# Patient Record
Sex: FEMALE | Race: WHITE | NOT HISPANIC OR LATINO | Employment: OTHER | URBAN - METROPOLITAN AREA
[De-identification: names, ages, dates, MRNs, and addresses within clinical notes are randomized per-mention and may not be internally consistent; named-entity substitution may affect disease eponyms.]

---

## 2020-08-21 ENCOUNTER — HOSPITAL ENCOUNTER (OUTPATIENT)
Dept: GENERAL RADIOLOGY | Facility: HOSPITAL | Age: 50
Discharge: HOME | End: 2020-08-21
Admitting: INTERNAL MEDICINE

## 2020-08-21 ENCOUNTER — TRANSCRIBE ORDERS (OUTPATIENT)
Dept: ADMINISTRATIVE | Facility: HOSPITAL | Age: 50
End: 2020-08-21

## 2020-08-21 DIAGNOSIS — R59.0 CERVICAL ADENOPATHY: ICD-10-CM

## 2020-08-21 DIAGNOSIS — R59.0 CERVICAL ADENOPATHY: Primary | ICD-10-CM

## 2020-08-21 PROCEDURE — 72050 X-RAY EXAM NECK SPINE 4/5VWS: CPT

## 2020-08-27 ENCOUNTER — TRANSCRIBE ORDERS (OUTPATIENT)
Dept: ADMINISTRATIVE | Facility: HOSPITAL | Age: 50
End: 2020-08-27

## 2020-08-27 DIAGNOSIS — M54.12 RADICULOPATHY, CERVICAL: Primary | ICD-10-CM

## 2020-08-28 ENCOUNTER — HOSPITAL ENCOUNTER (OUTPATIENT)
Dept: MRI IMAGING | Facility: HOSPITAL | Age: 50
Discharge: HOME | End: 2020-08-28
Admitting: INTERNAL MEDICINE

## 2020-08-28 DIAGNOSIS — M54.12 RADICULOPATHY, CERVICAL: ICD-10-CM

## 2020-08-28 PROCEDURE — 72141 MRI NECK SPINE W/O DYE: CPT

## 2021-05-05 ENCOUNTER — OFFICE VISIT (OUTPATIENT)
Dept: INTERNAL MEDICINE | Facility: CLINIC | Age: 51
End: 2021-05-05

## 2021-05-05 VITALS
SYSTOLIC BLOOD PRESSURE: 120 MMHG | BODY MASS INDEX: 29.37 KG/M2 | HEIGHT: 64 IN | HEART RATE: 81 BPM | OXYGEN SATURATION: 98 % | WEIGHT: 172 LBS | TEMPERATURE: 97.3 F | RESPIRATION RATE: 16 BRPM | DIASTOLIC BLOOD PRESSURE: 78 MMHG

## 2021-05-05 DIAGNOSIS — R63.5 WEIGHT GAIN: ICD-10-CM

## 2021-05-05 DIAGNOSIS — M43.22 FUSION OF SPINE, CERVICAL REGION: Primary | ICD-10-CM

## 2021-05-05 PROCEDURE — 99214 OFFICE O/P EST MOD 30 MIN: CPT | Performed by: INTERNAL MEDICINE

## 2021-05-05 RX ORDER — PHENTERMINE HYDROCHLORIDE 37.5 MG/1
37.5 CAPSULE ORAL EVERY MORNING
Qty: 30 CAPSULE | Refills: 1 | Status: SHIPPED | OUTPATIENT
Start: 2021-05-05 | End: 2021-07-06 | Stop reason: SDUPTHER

## 2021-05-05 RX ORDER — ESTRADIOL 0.07 MG/D
FILM, EXTENDED RELEASE TRANSDERMAL
COMMUNITY
Start: 2021-04-12

## 2021-05-05 RX ORDER — RIBOFLAVIN (VITAMIN B2) 100 MG
TABLET ORAL
COMMUNITY
End: 2022-04-04

## 2021-05-05 RX ORDER — LISINOPRIL AND HYDROCHLOROTHIAZIDE 20; 12.5 MG/1; MG/1
TABLET ORAL
COMMUNITY
Start: 2021-04-30 | End: 2021-07-30

## 2021-05-05 RX ORDER — MELATONIN
1000 DAILY
COMMUNITY
End: 2022-04-04

## 2021-05-05 RX ORDER — VENLAFAXINE HYDROCHLORIDE 150 MG/1
CAPSULE, EXTENDED RELEASE ORAL
COMMUNITY
Start: 2021-02-16 | End: 2021-05-14

## 2021-05-05 RX ORDER — BACLOFEN 10 MG/1
TABLET ORAL
COMMUNITY
Start: 2021-04-13 | End: 2021-07-22 | Stop reason: SDUPTHER

## 2021-05-05 NOTE — PROGRESS NOTES
"Chief Complaint  Weight Loss (discuss phentermine )    Subjective          Elif Lujan presents to [facility]  History of Present Illness  Denies any significant headache, shortness of breath or chest pain.  No visual changes.  Taking medication without complication.  She is interested in the phentermine again for weight loss.  She is having a difficult time.  She is also postop cervical fusion from January and not been very active.  She has just recently restarted some exercise  Objective   Vital Signs:   /78 (BP Location: Left arm, Patient Position: Sitting, Cuff Size: Large Adult)   Pulse 81   Temp 97.3 °F (36.3 °C) (Temporal)   Resp 16   Ht 162.6 cm (64\")   Wt 78 kg (172 lb)   SpO2 98%   BMI 29.52 kg/m²     Physical Exam  Vitals and nursing note reviewed.   Constitutional:       Appearance: Normal appearance.   HENT:      Head: Normocephalic and atraumatic.   Cardiovascular:      Rate and Rhythm: Normal rate and regular rhythm.   Pulmonary:      Effort: Pulmonary effort is normal.      Breath sounds: Normal breath sounds.   Abdominal:      General: Abdomen is flat.      Palpations: Abdomen is soft.   Musculoskeletal:         General: No swelling or deformity.      Cervical back: Neck supple.      Right lower leg: No edema.      Left lower leg: No edema.   Skin:     General: Skin is warm.      Capillary Refill: Capillary refill takes less than 2 seconds.      Findings: No rash.   Neurological:      General: No focal deficit present.      Mental Status: She is alert and oriented to person, place, and time.        Result Review :                 Assessment and Plan weight gain.  We talked about the difficulty is perimenopausal and postmenopausal with weight loss.  My fitness pal or similar maribell on the phone to monitor calories closely.  Exercise which she is just started back.  Phentermine 37.5 mg daily and recheck in 2 months or sooner if problems.  She " is taking it before without side effect  Post cervical fusion doing well.  Still going to physical therapy.  Has not started back to work but starts later this month.  Schedule wellness on follow-up  Diagnoses and all orders for this visit:    1. Fusion of spine, cervical region (Primary)    2. Weight gain  -     phentermine 37.5 MG capsule; Take 1 capsule by mouth Every Morning.  Dispense: 30 capsule; Refill: 1        Follow Up   Return in about 2 months (around 7/5/2021).  Patient was given instructions and counseling regarding her condition or for health maintenance advice. Please see specific information pulled into the AVS if appropriate.

## 2021-05-14 RX ORDER — VENLAFAXINE HYDROCHLORIDE 150 MG/1
CAPSULE, EXTENDED RELEASE ORAL
Qty: 90 CAPSULE | Refills: 0 | Status: SHIPPED | OUTPATIENT
Start: 2021-05-14 | End: 2021-08-12

## 2021-07-03 DIAGNOSIS — R63.5 WEIGHT GAIN: ICD-10-CM

## 2021-07-03 RX ORDER — PHENTERMINE HYDROCHLORIDE 37.5 MG/1
CAPSULE ORAL
Qty: 30 CAPSULE | Refills: 0 | OUTPATIENT
Start: 2021-07-03

## 2021-07-06 ENCOUNTER — OFFICE VISIT (OUTPATIENT)
Dept: INTERNAL MEDICINE | Facility: CLINIC | Age: 51
End: 2021-07-06

## 2021-07-06 VITALS
SYSTOLIC BLOOD PRESSURE: 136 MMHG | HEIGHT: 64 IN | DIASTOLIC BLOOD PRESSURE: 80 MMHG | TEMPERATURE: 97.3 F | RESPIRATION RATE: 16 BRPM | HEART RATE: 62 BPM | BODY MASS INDEX: 28.34 KG/M2 | OXYGEN SATURATION: 96 % | WEIGHT: 166 LBS

## 2021-07-06 DIAGNOSIS — Z12.11 COLON CANCER SCREENING: Primary | ICD-10-CM

## 2021-07-06 DIAGNOSIS — R63.5 WEIGHT GAIN: ICD-10-CM

## 2021-07-06 DIAGNOSIS — I10 ESSENTIAL (PRIMARY) HYPERTENSION: ICD-10-CM

## 2021-07-06 DIAGNOSIS — E66.3 EXCESS WEIGHT: ICD-10-CM

## 2021-07-06 PROCEDURE — 99214 OFFICE O/P EST MOD 30 MIN: CPT | Performed by: INTERNAL MEDICINE

## 2021-07-06 RX ORDER — PHENTERMINE HYDROCHLORIDE 37.5 MG/1
37.5 CAPSULE ORAL EVERY MORNING
Qty: 30 CAPSULE | Refills: 1 | Status: SHIPPED | OUTPATIENT
Start: 2021-07-06 | End: 2021-09-13 | Stop reason: SDUPTHER

## 2021-07-06 NOTE — PROGRESS NOTES
"Chief Complaint  Follow-up (phentermine rf)    Subjective          Elif Lujan presents to [facility]  History of Present Illness  Denies any significant headache, shortness of breath or chest pain.  No visual changes.  Taking medication without complication.  The phentermine does help suppress her appetite.  No specific dietary plan although she has tried to reduce caloric intake.  Not really and exercise are typically  Objective   Vital Signs:   /80 (BP Location: Left arm, Patient Position: Sitting, Cuff Size: Large Adult)   Pulse 62   Temp 97.3 °F (36.3 °C) (Temporal)   Resp 16   Ht 162.6 cm (64\")   Wt 75.3 kg (166 lb)   SpO2 96%   BMI 28.49 kg/m²     Physical Exam  Vitals and nursing note reviewed.   Constitutional:       Appearance: Normal appearance.   HENT:      Head: Normocephalic and atraumatic.   Cardiovascular:      Rate and Rhythm: Normal rate and regular rhythm.   Pulmonary:      Effort: Pulmonary effort is normal.      Breath sounds: Normal breath sounds.   Abdominal:      General: Abdomen is flat.      Palpations: Abdomen is soft.   Musculoskeletal:         General: No swelling or deformity.      Cervical back: Neck supple.      Right lower leg: No edema.      Left lower leg: No edema.   Skin:     General: Skin is warm.      Capillary Refill: Capillary refill takes less than 2 seconds.      Findings: No rash.   Neurological:      General: No focal deficit present.      Mental Status: She is alert and oriented to person, place, and time.        Result Review :                 Assessment and Plan essential hypertension and hyperlipidemia.  She is on the phentermine with pretty good results.  A 3 pound weight loss each month since last visit.  She is still wants to continue the medication.  Again I strongly encouraged caloric restriction and exercise.  Cervical neck pain status post cervical fusion with still some weakness in her right arm but " improving slowly.  Continuing physical therapy  Diagnoses and all orders for this visit:    1. Colon cancer screening (Primary)  -     Ambulatory Referral to Gastroenterology    2. Weight gain  -     phentermine 37.5 MG capsule; Take 1 capsule by mouth Every Morning.  Dispense: 30 capsule; Refill: 1    3. Essential (primary) hypertension    4. Excess weight        Follow Up   Return in about 2 months (around 9/6/2021) for Annual physical.  Patient was given instructions and counseling regarding her condition or for health maintenance advice. Please see specific information pulled into the AVS if appropriate.

## 2021-07-13 ENCOUNTER — TRANSCRIBE ORDERS (OUTPATIENT)
Dept: ADMINISTRATIVE | Facility: HOSPITAL | Age: 51
End: 2021-07-13

## 2021-07-13 DIAGNOSIS — M47.22 CERVICAL SPONDYLOSIS WITH RADICULOPATHY: Primary | ICD-10-CM

## 2021-07-22 RX ORDER — BACLOFEN 10 MG/1
20 TABLET ORAL 3 TIMES DAILY PRN
Qty: 30 TABLET | Refills: 2 | Status: SHIPPED | OUTPATIENT
Start: 2021-07-22 | End: 2021-08-30

## 2021-07-22 NOTE — TELEPHONE ENCOUNTER
Caller: Elif Lujan    Relationship: Self    Best call back number: [phone]    Medication needed:   Requested Prescriptions     Pending Prescriptions Disp Refills   • baclofen (LIORESAL) 10 MG tablet         When do you need the refill by: ASAP    What additional details did the patient provide when requesting the medication: P[ATIENT IS OUT OF MEDICATION. NEEDS REFILLED FOR NERVE DAMAGE IN RIGHT ARM.    Does the patient have less than a 3 day supply:  [x] Yes  [] No    What is the patient's preferred pharmacy: [facility] [address] [facility] [address] - [phone] PH - [phone]

## 2021-07-30 RX ORDER — LISINOPRIL AND HYDROCHLOROTHIAZIDE 20; 12.5 MG/1; MG/1
TABLET ORAL
Qty: 135 TABLET | Refills: 1 | Status: SHIPPED | OUTPATIENT
Start: 2021-07-30 | End: 2022-02-14 | Stop reason: SDUPTHER

## 2021-08-02 ENCOUNTER — HOSPITAL ENCOUNTER (OUTPATIENT)
Dept: MRI IMAGING | Facility: HOSPITAL | Age: 51
End: 2021-08-02

## 2021-08-12 RX ORDER — VENLAFAXINE HYDROCHLORIDE 150 MG/1
CAPSULE, EXTENDED RELEASE ORAL
Qty: 90 CAPSULE | Refills: 0 | Status: SHIPPED | OUTPATIENT
Start: 2021-08-12 | End: 2021-08-15

## 2021-08-13 ENCOUNTER — TELEPHONE (OUTPATIENT)
Dept: INTERNAL MEDICINE | Facility: CLINIC | Age: 51
End: 2021-08-13

## 2021-08-13 NOTE — TELEPHONE ENCOUNTER
Caller: Julia Lujan    Relationship: Self    Best call back number: [phone]    What medication are you requesting: VENLAFAXINE XR    If a prescription is needed, what is your preferred pharmacy and phone number: [facility] [place] [facility] [address] [facility] [address] - [phone] PH - [phone] FX     Additional notes: PATIENT IS COMPLETELY OUT OF MEDICATION AND PHARMACY IS STATING THEY NEVER RECEIVED THE PRESCRIPTION.

## 2021-08-15 RX ORDER — VENLAFAXINE HYDROCHLORIDE 150 MG/1
CAPSULE, EXTENDED RELEASE ORAL
Qty: 90 CAPSULE | Refills: 0 | Status: SHIPPED | OUTPATIENT
Start: 2021-08-15 | End: 2022-02-01

## 2021-08-30 RX ORDER — BACLOFEN 10 MG/1
TABLET ORAL
Qty: 30 TABLET | Refills: 2 | Status: SHIPPED | OUTPATIENT
Start: 2021-08-30 | End: 2021-11-06

## 2021-08-31 ENCOUNTER — TELEPHONE (OUTPATIENT)
Dept: GASTROENTEROLOGY | Facility: CLINIC | Age: 51
End: 2021-08-31

## 2021-09-13 ENCOUNTER — OFFICE VISIT (OUTPATIENT)
Dept: INTERNAL MEDICINE | Facility: CLINIC | Age: 51
End: 2021-09-13

## 2021-09-13 VITALS
OXYGEN SATURATION: 98 % | SYSTOLIC BLOOD PRESSURE: 124 MMHG | DIASTOLIC BLOOD PRESSURE: 72 MMHG | HEIGHT: 64 IN | WEIGHT: 167 LBS | HEART RATE: 93 BPM | BODY MASS INDEX: 28.51 KG/M2 | TEMPERATURE: 96.9 F | RESPIRATION RATE: 16 BRPM

## 2021-09-13 DIAGNOSIS — R63.5 WEIGHT GAIN: ICD-10-CM

## 2021-09-13 DIAGNOSIS — I10 ESSENTIAL (PRIMARY) HYPERTENSION: Primary | ICD-10-CM

## 2021-09-13 PROCEDURE — 99214 OFFICE O/P EST MOD 30 MIN: CPT | Performed by: INTERNAL MEDICINE

## 2021-09-13 RX ORDER — PHENTERMINE HYDROCHLORIDE 37.5 MG/1
37.5 CAPSULE ORAL EVERY MORNING
Qty: 30 CAPSULE | Refills: 1 | OUTPATIENT
Start: 2021-09-13 | End: 2021-12-15

## 2021-09-13 NOTE — PROGRESS NOTES
"Chief Complaint  Follow-up (weight loss), Hypertension, and Anxiety    Subjective          Julia Lujan presents to [facility]  History of Present Illness  Follow-up for anxiety. Is doing well on the medication. Is tolerating the medication without side effect.  Mood is doing better.  Sleep is good.  She still wants to take the phentermine although has not really lost any weight.  She is going through a lot with her 's colon cancer and I think some of that is contributing to the stress overall.  Objective   Vital Signs:   /72 (BP Location: Left arm, Patient Position: Sitting, Cuff Size: Large Adult)   Pulse 93   Temp 96.9 °F (36.1 °C) (Temporal)   Resp 16   Ht 162.6 cm (64\")   Wt 75.8 kg (167 lb)   SpO2 98%   BMI 28.67 kg/m²     Physical Exam  Vitals and nursing note reviewed.   Constitutional:       Appearance: Normal appearance.   HENT:      Head: Normocephalic and atraumatic.   Cardiovascular:      Rate and Rhythm: Normal rate and regular rhythm.   Pulmonary:      Effort: Pulmonary effort is normal.      Breath sounds: Normal breath sounds.   Abdominal:      General: Abdomen is flat.      Palpations: Abdomen is soft.   Musculoskeletal:         General: No swelling or deformity.      Cervical back: Neck supple.      Right lower leg: No edema.      Left lower leg: No edema.   Skin:     General: Skin is warm.      Capillary Refill: Capillary refill takes less than 2 seconds.      Findings: No rash.   Neurological:      General: No focal deficit present.      Mental Status: She is alert and oriented to person, place, and time.        Result Review : Reviewed previous labs                Assessment and Plan anxiety disorder and depression.  Continue the venlafaxine.  Seems to be doing pretty well.  Weight gain and difficulty losing weight again we have had this conversation multiple times.  I think the phentermine is of limited use or value.  She " wants to continue it at least another couple of months.  Her  is undergoing salvage radiation for colon cancer.  A lot of stress related to that of course.  We will continue it for now.  Encouraged weight watchers, intermittent fasting other strategies for caloric restriction and exercise.  Blood pressure stable.  Continue current medication.  Get colonoscopy!  She says she filled out the paperwork to complete  There are no diagnoses linked to this encounter.    Follow Up   No follow-ups on file.  Patient was given instructions and counseling regarding her condition or for health maintenance advice. Please see specific information pulled into the AVS if appropriate.

## 2021-10-21 NOTE — TELEPHONE ENCOUNTER
RETURN CALL FROM PATIENT.  SCHEDULED AT [place] ON 03/11/2022 AT 1:15PM, ARRIVE 12PM.  WILL MAIL INSTRUCTIONS.

## 2021-10-24 ENCOUNTER — PREP FOR SURGERY (OUTPATIENT)
Dept: OTHER | Facility: HOSPITAL | Age: 51
End: 2021-10-24

## 2021-10-24 DIAGNOSIS — Z12.11 SCREEN FOR COLON CANCER: Primary | ICD-10-CM

## 2021-10-25 PROBLEM — Z12.11 SCREEN FOR COLON CANCER: Status: ACTIVE | Noted: 2021-10-25

## 2021-11-06 RX ORDER — BACLOFEN 10 MG/1
TABLET ORAL
Qty: 30 TABLET | Refills: 2 | Status: SHIPPED | OUTPATIENT
Start: 2021-11-06 | End: 2022-03-11 | Stop reason: HOSPADM

## 2021-11-25 DIAGNOSIS — R63.5 WEIGHT GAIN: ICD-10-CM

## 2021-11-27 RX ORDER — PHENTERMINE HYDROCHLORIDE 37.5 MG/1
CAPSULE ORAL
Qty: 30 CAPSULE | OUTPATIENT
Start: 2021-11-27

## 2021-12-15 ENCOUNTER — HOSPITAL ENCOUNTER (EMERGENCY)
Facility: HOSPITAL | Age: 51
Discharge: HOME | End: 2021-12-15
Attending: EMERGENCY MEDICINE | Admitting: EMERGENCY MEDICINE

## 2021-12-15 ENCOUNTER — APPOINTMENT (OUTPATIENT)
Dept: GENERAL RADIOLOGY | Facility: HOSPITAL | Age: 51
End: 2021-12-15

## 2021-12-15 VITALS
HEIGHT: 64 IN | BODY MASS INDEX: 29.71 KG/M2 | TEMPERATURE: 97.7 F | WEIGHT: 174 LBS | DIASTOLIC BLOOD PRESSURE: 81 MMHG | HEART RATE: 75 BPM | SYSTOLIC BLOOD PRESSURE: 116 MMHG | RESPIRATION RATE: 16 BRPM | OXYGEN SATURATION: 100 %

## 2021-12-15 DIAGNOSIS — S83.92XA SPRAIN OF LEFT KNEE, UNSPECIFIED LIGAMENT, INITIAL ENCOUNTER: Primary | ICD-10-CM

## 2021-12-15 PROCEDURE — 99283 EMERGENCY DEPT VISIT LOW MDM: CPT

## 2021-12-15 PROCEDURE — 73562 X-RAY EXAM OF KNEE 3: CPT

## 2021-12-15 PROCEDURE — 99283 EMERGENCY DEPT VISIT LOW MDM: CPT | Performed by: EMERGENCY MEDICINE

## 2021-12-15 NOTE — DISCHARGE INSTRUCTIONS
Rest, ice, elevate the knee as needed.  Limit weightbearing activities until you are seen by the orthopedics doctor.  Please return to the emergency room for any worsening pain, swelling, fevers, weakness or any other concerns.

## 2021-12-15 NOTE — ED NOTES
Pt complaining of LLE pain since last night. Pt stated that she was getting into her SUV and heard a pop. Pt able to ambulate after the incident. Pt is able to bend and extend her knee without difficulty. No signs of trauma noted.     Elmer Mendenhall, RN  12/15/21 8857       Elmer Mendenhall RN  12/15/21 1113

## 2021-12-15 NOTE — ED PROVIDER NOTES
Subjective   History of Present Illness  History of Present Illness    Chief complaint: Knee pain    Location: Left knee    Quality/Severity: Moderate pain    Timing/Duration: Injured last evening    Modifying Factors: Hurts to stand or walk    Narrative: This patient presents for evaluation of pain in the left knee area.  She has had some knee issues for quite some time that she attributes to repetitive twisting and turning while on her feet at work as a .  However, yesterday evening, she was stepping up into her [employer] SUV and when doing so she had a sudden pop with intense pain towards the back of the knee on the left side.  Since then the knee has persistently hurt in the same area.  It is difficult for her to stand or walk because of the pain today.  She denies any other areas of injury or concern.    Associated Symptoms: As above    Review of Systems   Constitutional: Negative for activity change and fever.   HENT: Negative.    Eyes: Negative for visual disturbance.   Respiratory: Negative for cough and shortness of breath.    Cardiovascular: Negative for chest pain.   Gastrointestinal: Negative for abdominal pain.   Musculoskeletal: Positive for arthralgias and gait problem. Negative for myalgias.   Skin: Negative for color change and rash.   Neurological: Negative for syncope and weakness.   All other systems reviewed and are negative.      Past Medical History:   Diagnosis Date   • Cervicalgia    • Chest pain, unspecified    • Elevated blood pressure reading without diagnosis of hypertension    • Essential (primary) hypertension    • H/O mammogram 01/2019   • Headache    • Headache    • Hematuria, unspecified    • Lesion of sciatic nerve, left lower limb    • Major depressive disorder, single episode, unspecified    • Menopausal and female climacteric states    • Nevus, non-neoplastic    • Nontraumatic hematoma of soft tissue    • Other fatigue    • Pain in limb    • Pain in thoracic  "spine    • Papanicolaou smear 2017   • Pneumonia, unspecified organism    • Radiculopathy, cervical region    • Rib pain    • Unspecified injury of muscle(s) and tendon(s) of the rotator cuff of right shoulder, initial encounter        Allergies   Allergen Reactions   • Hydrocodone-Acetaminophen Rash     \"Hyper\"   • Prednisone Rash       Past Surgical History:   Procedure Laterality Date   • HYSTERECTOMY     • NECK SURGERY         Family History   Problem Relation Age of Onset   • Lymphoma Mother    • Other Father         BLADDER CANCER   • Breast cancer Maternal Grandmother    • Other Maternal Uncle         BRACA GENE+       Social History     Socioeconomic History   • Marital status:    • Number of children: 2   Tobacco Use   • Smoking status: Former Smoker   • Smokeless tobacco: Never Used   • Tobacco comment: QUIT 2019 STATED AGE 13   Vaping Use   • Vaping Use: Never used   Substance and Sexual Activity   • Alcohol use: Yes     Comment: WINE 5X A WEEK   • Drug use: Yes     Types: Marijuana     Comment: ROUTINELY   • Sexual activity: Defer     ED Triage Vitals [12/15/21 1054]   Temp Heart Rate Resp BP SpO2   97.7 °F (36.5 °C) 76 16 142/78 99 %      Temp src Heart Rate Source Patient Position BP Location FiO2 (%)   Oral Monitor -- -- --     Objective   Physical Exam  Vitals and nursing note reviewed.   Constitutional:       Appearance: She is well-developed.   HENT:      Head: Normocephalic and atraumatic.   Eyes:      General:         Right eye: No discharge.         Left eye: No discharge.      Pupils: Pupils are equal, round, and reactive to light.   Cardiovascular:      Rate and Rhythm: Normal rate and regular rhythm.      Pulses: Normal pulses.   Pulmonary:      Effort: Pulmonary effort is normal. No respiratory distress.   Musculoskeletal:         General: Tenderness present. No deformity. Normal range of motion.      Cervical back: Normal range of motion and neck supple.      Comments: Moderate " "tenderness throughout the posterior and medial and lateral aspects of the left knee.  Anterior aspect is less tender.  Minimal soft tissue swelling evident.  Patient is able to flex and extend the knee through completely normal range of motion.  No popping or grinding or clicking palpable through range of motion testing.  She has normal strength to all muscle groups of the left leg.  Distal neurovascular exam is normal   Skin:     General: Skin is warm and dry.      Findings: No bruising, erythema or rash.   Neurological:      General: No focal deficit present.      Mental Status: She is alert and oriented to person, place, and time. Mental status is at baseline.   Psychiatric:         Behavior: Behavior normal.         Thought Content: Thought content normal.         Judgment: Judgment normal.       RADIOLOGY        Study: X-ray left knee    Findings: Negative.    Interpreted contemporaneously with treatment by Dr. Ibarra, independently viewed by me    Procedures           ED Course  ED Course as of 12/15/21 1209   Wed Dec 15, 2021   1208 I reviewed the x-ray which was reassuring for no acute osseous injury.  I spoke to the patient about the need for follow-up in orthopedics clinic for further evaluation of her pain symptoms which will likely need further imaging such as MRI.  We placed a knee immobilizer on her for supportive care.  Advised nonsteroidal anti-inflammatory medications as needed for now.  Discharged home in good condition with usual \"return to ER\" instructions [STEPHANIE]      ED Course User Index  [STEPHANIE] Denny Mares MD                                                 MDM  Number of Diagnoses or Management Options     Amount and/or Complexity of Data Reviewed  Tests in the radiology section of CPT®: reviewed and ordered  Independent visualization of images, tracings, or specimens: yes    Risk of Complications, Morbidity, and/or Mortality  Presenting problems: moderate  Diagnostic procedures: " low  Management options: moderate        Final diagnoses:   Sprain of left knee, unspecified ligament, initial encounter       ED Disposition  ED Disposition     ED Disposition Condition Comment    Discharge Stable           Kale Wilkerson MD  [address] [id number] [phone]    Call today  Call the orthopedics office today to schedule a prompt follow-up appointment for further evaluation         Medication List      Stop    phentermine 37.5 MG capsule             Denny Mares MD  12/15/21 0642

## 2022-01-13 ENCOUNTER — OFFICE VISIT (OUTPATIENT)
Dept: ORTHOPEDIC SURGERY | Facility: CLINIC | Age: 52
End: 2022-01-13

## 2022-01-13 ENCOUNTER — TELEPHONE (OUTPATIENT)
Dept: ORTHOPEDIC SURGERY | Facility: CLINIC | Age: 52
End: 2022-01-13

## 2022-01-13 VITALS
BODY MASS INDEX: 29.71 KG/M2 | SYSTOLIC BLOOD PRESSURE: 118 MMHG | HEIGHT: 64 IN | DIASTOLIC BLOOD PRESSURE: 62 MMHG | WEIGHT: 174 LBS

## 2022-01-13 DIAGNOSIS — M25.462 EFFUSION OF LEFT KNEE: ICD-10-CM

## 2022-01-13 DIAGNOSIS — M25.562 MECHANICAL KNEE PAIN, LEFT: Primary | ICD-10-CM

## 2022-01-13 PROCEDURE — 99203 OFFICE O/P NEW LOW 30 MIN: CPT | Performed by: ORTHOPAEDIC SURGERY

## 2022-01-13 NOTE — TELEPHONE ENCOUNTER
Left pt. Message regarding mri.  Stated that she was just seen today and Dr. Wilkerson is still seeing patients.  MRI will have to get authorized prior to scheduling and she should receive a call in a couple days.  If not, she can contact the MRI scheduling dept.  jae

## 2022-01-13 NOTE — TELEPHONE ENCOUNTER
Provider:  DR. MYLES    Caller: YANETH HINKLE    Relationship to Patient: SELF     Phone Number: [phone]    Reason for Call:  PATIENT ADVISED SHE DISCUSSED GETTING MRI WITH DR. MYLES TODAY. PATIENT STATED SHE CONTACTED [facility] TO SCHEDULE MRI AND WAS TOLD DR. MYLES HAS NOT PUT IN THE MRI ORDER.       PATIENT ADVISED SHE NEEDS DR. MYLES TO SEND MRI ORDER  FOR HER LEFT KNEE TO [facility].

## 2022-01-13 NOTE — PROGRESS NOTES
The patient has consented to being recorded using LAURA.     Subjective:     Patient ID: Julia Lujan is a 51 y.o. female.    Chief Complaint:  Left knee pain, new patient    History of Present Illness  Julia Lujan presents to clinic today for evaluation of of left knee pain. The patient states that she had neck surgery in 01/2021. She compensated for decreased cervical range of motion with lots of pivoting through the knees. She also reports exercising on both the treadmill and elliptical. Following all of this, 3 weeks ago she was getting into the passenger's side of her truck when she felt a heard a pop in the left knee. The patient presented to the ER with minimal swelling. She reports pain is aching in nature and 2 out of 10 in intensity with standing; however, it will reach a 6 to 7 out of 10 with squatting and pivoting. She denies locking, catching, shooting pain down the left lower extremity, and giving way. Alleviating factors include rest and activity modification.      Social History     Occupational History   • Occupation:    Tobacco Use   • Smoking status: Former Smoker   • Smokeless tobacco: Never Used   • Tobacco comment: QUIT 2019 STATED AGE 13   Vaping Use   • Vaping Use: Never used   Substance and Sexual Activity   • Alcohol use: Yes     Comment: WINE 5X A WEEK   • Drug use: Yes     Types: Marijuana     Comment: ROUTINELY   • Sexual activity: Defer      Past Medical History:   Diagnosis Date   • Cervicalgia    • Chest pain, unspecified    • Elevated blood pressure reading without diagnosis of hypertension    • Essential (primary) hypertension    • H/O mammogram 01/2019   • Headache    • Headache    • Hematuria, unspecified    • Lesion of sciatic nerve, left lower limb    • Major depressive disorder, single episode, unspecified    • Menopausal and female climacteric states    • Nevus, non-neoplastic    • Nontraumatic hematoma of soft tissue    • Other fatigue    • Pain in limb   "  • Pain in thoracic spine    • Papanicolaou smear 2017   • Pneumonia, unspecified organism    • Radiculopathy, cervical region    • Rib pain    • Unspecified injury of muscle(s) and tendon(s) of the rotator cuff of right shoulder, initial encounter      Past Surgical History:   Procedure Laterality Date   • HYSTERECTOMY     • NECK SURGERY         Family History   Problem Relation Age of Onset   • Lymphoma Mother    • Other Father         BLADDER CANCER   • Breast cancer Maternal Grandmother    • Other Maternal Uncle         BRACA GENE+         Review of Systems        Objective:  Vitals:    01/13/22 1234   BP: 118/62   BP Location: Right arm   Weight: 78.9 kg (174 lb)   Height: 162.6 cm (64\")         01/13/22  1234   Weight: 78.9 kg (174 lb)     Body mass index is 29.87 kg/m².  Physical Exam    Vital signs reviewed.   General: No acute distress, alert and oriented  Eyes: conjunctiva clear; pupils equally round and reactive  ENT: external ears and nose atraumatic; oropharynx clear  CV: no peripheral edema  Resp: normal respiratory effort  Skin: no rashes or wounds; normal turgor  Psych: mood and affect appropriate; recent and remote memory intact          Ortho Exam     Left Knee-     ROM 0 to 130 degrees  Strength 4+/5 on flexion  Strength 4+/5 on extension  Maximal tenderness along the lateral joint line.     Effusion- Moderate.   Grade 1A Lachman  Anterior drawer- Negative.   Posterior drawer- Negative.   Stable to varus and valgus stress at 0 and 30 degrees.      Amy's- Positive with pain along the lateral joint line with mild click.   Dial's- Negative.       Positive sensation light touch all distributions symmetric to contralateral side  Brisk cap refill all digits  2+ dorsalis pedis pulse    Imaging:  Review of outside x-ray left knee from December 15, 2021, these appear to be nonweightbearing x-rays, indicate no evidence of fracture, dislocation, or subluxation.  No comparison images " available.    Assessment:        1. Mechanical knee pain, left    2. Effusion of left knee           Plan:          1. Discussed treatment options at length with patient at today's visit to include observation with conservative management with anti-inflammatories and/or oral steroid versus MRI to evaluate for meniscus tear.   2. Patient does wish to proceed with MRI at this point in time to evaluate for meniscal pathology.   3. I will contact the patient over the phone to review results and discuss further treatment options at that time.       Julia Lujan was in agreement with plan and had all questions answered.     Orders:  Orders Placed This Encounter   Procedures   • MRI Knee Left Without Contrast       Medications:  No orders of the defined types were placed in this encounter.      Followup:  Return for review of MRI results.    Diagnoses and all orders for this visit:    1. Mechanical knee pain, left (Primary)  -     MRI Knee Left Without Contrast; Future    2. Effusion of left knee  -     MRI Knee Left Without Contrast; Future          Dictated utilizing Dragon dictation     Transcribed from ambient dictation for Kale Wilkerson MD by Yusra Mohr.  01/13/22   16:34 EST    Patient verbalized consent to the visit recording.

## 2022-01-13 NOTE — TELEPHONE ENCOUNTER
When you get a moment can you please place the MRI order for the attached patient's Left knee to be done at [facility].    Thanks.

## 2022-02-01 RX ORDER — VENLAFAXINE HYDROCHLORIDE 150 MG/1
CAPSULE, EXTENDED RELEASE ORAL
Qty: 90 CAPSULE | Refills: 0 | Status: SHIPPED | OUTPATIENT
Start: 2022-02-01 | End: 2022-05-07

## 2022-02-03 ENCOUNTER — HOSPITAL ENCOUNTER (OUTPATIENT)
Dept: MRI IMAGING | Facility: HOSPITAL | Age: 52
End: 2022-02-03

## 2022-02-11 ENCOUNTER — HOSPITAL ENCOUNTER (OUTPATIENT)
Dept: MRI IMAGING | Facility: HOSPITAL | Age: 52
Discharge: HOME | End: 2022-02-11
Admitting: ORTHOPAEDIC SURGERY

## 2022-02-11 DIAGNOSIS — M25.562 MECHANICAL KNEE PAIN, LEFT: ICD-10-CM

## 2022-02-11 DIAGNOSIS — M25.462 EFFUSION OF LEFT KNEE: ICD-10-CM

## 2022-02-11 PROCEDURE — 73721 MRI JNT OF LWR EXTRE W/O DYE: CPT

## 2022-02-14 RX ORDER — LISINOPRIL AND HYDROCHLOROTHIAZIDE 20; 12.5 MG/1; MG/1
1.5 TABLET ORAL DAILY
Qty: 135 TABLET | Refills: 0 | Status: ON HOLD | OUTPATIENT
Start: 2022-02-14 | End: 2022-04-12

## 2022-02-15 ENCOUNTER — TELEPHONE (OUTPATIENT)
Dept: ORTHOPEDIC SURGERY | Facility: CLINIC | Age: 52
End: 2022-02-15

## 2022-02-15 NOTE — TELEPHONE ENCOUNTER
"Patient calling for MRI results of her Left knee per the chart note 01.13.2022 \"I will contact the patient over the phone to review results and discuss further treatment options at that time\"    MRI Knee Left Without Contrast    Result Date: 2/14/2022  MRI Knee LT WO INDICATION:  Posterior left knee pain intermittently for 2 years. No specific injury. No prior left knee surgery. TECHNIQUE: MRI of the left knee without IV contrast. COMPARISON: Left knee x-rays 12/15/2021 FINDINGS: Menisci:    Vertical radial tear of the posterior horn/posterior root medial meniscus with partial extrusion of the body segment from the medial joint line. Lateral meniscus is intact. Ligaments:    Cruciate ligaments:  Intact.    Medial collateral ligament complex:  Intact.    Lateral collateral ligament complex:  Intact. Extensor mechanism: Intact. Fluid: Small to moderate joint effusion. 5 cm popliteal cyst with fluid tracking along the posterior fascial planes of the leg, suggesting rupture/leak. Articular cartilage:    Patellofemoral compartment:  Full-thickness chondral fissuring along the median ridge of the patella. Moderate to high-grade chondromalacia of the central and medial femoral trochlea.    Medial compartment:  Moderate grade chondromalacia along the weightbearing surfaces of the medial compartment. High-grade chondromalacia along the posterior nonweightbearing medial femoral condyle.    Lateral compartment: Moderate grade chondromalacia along the posterior nonweightbearing lateral femoral condyle. Osseous structures and musculature:  Mild reactive subarticular marrow edema along the medial peripheral aspect of the medial tibial plateau. Remainder of the bone marrow signal is within expected limits. Visualized musculature is within normal limits.     1. Vertical radial tear of the posterior horn/posterior root medial meniscus resulting in partial extrusion of the body segment from the medial joint line. 2. Cruciate and " collateral ligaments are intact. 3. Tricompartmental chondromalacia, detailed above. 4. Small to moderate joint effusion with a ruptured/leaking 5 cm popliteal cyst. Signer Name: Noe Sanchez MD  Signed: 2/14/2022 9:02 AM  Workstation Name: [id number]  [facility]    Please advise.

## 2022-02-17 ENCOUNTER — OFFICE VISIT (OUTPATIENT)
Dept: ORTHOPEDIC SURGERY | Facility: CLINIC | Age: 52
End: 2022-02-17

## 2022-02-17 VITALS
WEIGHT: 171 LBS | HEIGHT: 64 IN | DIASTOLIC BLOOD PRESSURE: 78 MMHG | HEART RATE: 94 BPM | BODY MASS INDEX: 29.19 KG/M2 | SYSTOLIC BLOOD PRESSURE: 118 MMHG

## 2022-02-17 DIAGNOSIS — R29.898 SHOULDER WEAKNESS: ICD-10-CM

## 2022-02-17 DIAGNOSIS — S83.232A COMPLEX TEAR OF MEDIAL MENISCUS OF LEFT KNEE AS CURRENT INJURY, INITIAL ENCOUNTER: ICD-10-CM

## 2022-02-17 DIAGNOSIS — M25.511 RIGHT SHOULDER PAIN, UNSPECIFIED CHRONICITY: Primary | ICD-10-CM

## 2022-02-17 PROCEDURE — 99214 OFFICE O/P EST MOD 30 MIN: CPT | Performed by: ORTHOPAEDIC SURGERY

## 2022-02-17 PROCEDURE — 73030 X-RAY EXAM OF SHOULDER: CPT | Performed by: ORTHOPAEDIC SURGERY

## 2022-02-17 RX ORDER — MELOXICAM 15 MG/1
15 TABLET ORAL DAILY
Qty: 30 TABLET | Refills: 0 | Status: SHIPPED | OUTPATIENT
Start: 2022-02-17 | End: 2022-03-24

## 2022-02-17 RX ORDER — METHYLPREDNISOLONE 4 MG/1
TABLET ORAL
Qty: 1 EACH | Refills: 0 | Status: SHIPPED | OUTPATIENT
Start: 2022-02-17 | End: 2022-03-11 | Stop reason: HOSPADM

## 2022-03-04 ENCOUNTER — HOSPITAL ENCOUNTER (OUTPATIENT)
Dept: MRI IMAGING | Facility: HOSPITAL | Age: 52
Discharge: HOME | End: 2022-03-04
Admitting: ORTHOPAEDIC SURGERY

## 2022-03-04 DIAGNOSIS — R29.898 SHOULDER WEAKNESS: ICD-10-CM

## 2022-03-04 DIAGNOSIS — M25.511 RIGHT SHOULDER PAIN, UNSPECIFIED CHRONICITY: ICD-10-CM

## 2022-03-04 PROCEDURE — 73221 MRI JOINT UPR EXTREM W/O DYE: CPT

## 2022-03-10 ENCOUNTER — ANESTHESIA EVENT (OUTPATIENT)
Dept: PERIOP | Facility: HOSPITAL | Age: 52
End: 2022-03-10

## 2022-03-11 ENCOUNTER — HOSPITAL ENCOUNTER (OUTPATIENT)
Facility: HOSPITAL | Age: 52
Setting detail: HOSPITAL OUTPATIENT SURGERY
Discharge: HOME | End: 2022-03-11
Attending: INTERNAL MEDICINE | Admitting: INTERNAL MEDICINE

## 2022-03-11 ENCOUNTER — ANESTHESIA (OUTPATIENT)
Dept: PERIOP | Facility: HOSPITAL | Age: 52
End: 2022-03-11

## 2022-03-11 VITALS
SYSTOLIC BLOOD PRESSURE: 154 MMHG | BODY MASS INDEX: 29.49 KG/M2 | HEART RATE: 59 BPM | WEIGHT: 171.8 LBS | OXYGEN SATURATION: 98 % | RESPIRATION RATE: 14 BRPM | DIASTOLIC BLOOD PRESSURE: 96 MMHG | TEMPERATURE: 97.7 F

## 2022-03-11 DIAGNOSIS — Z12.11 SCREEN FOR COLON CANCER: ICD-10-CM

## 2022-03-11 LAB — POTASSIUM SERPL-SCNC: 3.6 MMOL/L (ref 3.5–5.2)

## 2022-03-11 PROCEDURE — 84132 ASSAY OF SERUM POTASSIUM: CPT | Performed by: NURSE ANESTHETIST, CERTIFIED REGISTERED

## 2022-03-11 PROCEDURE — 25010000002 PROPOFOL 10 MG/ML EMULSION: Performed by: NURSE ANESTHETIST, CERTIFIED REGISTERED

## 2022-03-11 PROCEDURE — 45380 COLONOSCOPY AND BIOPSY: CPT | Performed by: INTERNAL MEDICINE

## 2022-03-11 PROCEDURE — 88305 TISSUE EXAM BY PATHOLOGIST: CPT | Performed by: INTERNAL MEDICINE

## 2022-03-11 RX ORDER — ONDANSETRON 2 MG/ML
4 INJECTION INTRAMUSCULAR; INTRAVENOUS ONCE AS NEEDED
Status: CANCELLED | OUTPATIENT
Start: 2022-03-11

## 2022-03-11 RX ORDER — LIDOCAINE HYDROCHLORIDE 10 MG/ML
0.5 INJECTION, SOLUTION EPIDURAL; INFILTRATION; INTRACAUDAL; PERINEURAL ONCE AS NEEDED
Status: DISCONTINUED | OUTPATIENT
Start: 2022-03-11 | End: 2022-03-11 | Stop reason: HOSPADM

## 2022-03-11 RX ORDER — SODIUM CHLORIDE 0.9 % (FLUSH) 0.9 %
10 SYRINGE (ML) INJECTION EVERY 12 HOURS SCHEDULED
Status: DISCONTINUED | OUTPATIENT
Start: 2022-03-11 | End: 2022-03-11 | Stop reason: HOSPADM

## 2022-03-11 RX ORDER — SODIUM CHLORIDE 0.9 % (FLUSH) 0.9 %
10 SYRINGE (ML) INJECTION AS NEEDED
Status: DISCONTINUED | OUTPATIENT
Start: 2022-03-11 | End: 2022-03-11 | Stop reason: HOSPADM

## 2022-03-11 RX ORDER — DIPHENHYDRAMINE HYDROCHLORIDE 50 MG/ML
12.5 INJECTION INTRAMUSCULAR; INTRAVENOUS
Status: CANCELLED | OUTPATIENT
Start: 2022-03-11

## 2022-03-11 RX ORDER — SODIUM CHLORIDE 9 MG/ML
40 INJECTION, SOLUTION INTRAVENOUS AS NEEDED
Status: DISCONTINUED | OUTPATIENT
Start: 2022-03-11 | End: 2022-03-11 | Stop reason: HOSPADM

## 2022-03-11 RX ORDER — LIDOCAINE HYDROCHLORIDE 20 MG/ML
INJECTION, SOLUTION INFILTRATION; PERINEURAL AS NEEDED
Status: DISCONTINUED | OUTPATIENT
Start: 2022-03-11 | End: 2022-03-11 | Stop reason: SURG

## 2022-03-11 RX ORDER — SODIUM CHLORIDE, SODIUM LACTATE, POTASSIUM CHLORIDE, CALCIUM CHLORIDE 600; 310; 30; 20 MG/100ML; MG/100ML; MG/100ML; MG/100ML
9 INJECTION, SOLUTION INTRAVENOUS CONTINUOUS
Status: DISCONTINUED | OUTPATIENT
Start: 2022-03-11 | End: 2022-03-11 | Stop reason: HOSPADM

## 2022-03-11 RX ORDER — SODIUM CHLORIDE, SODIUM LACTATE, POTASSIUM CHLORIDE, CALCIUM CHLORIDE 600; 310; 30; 20 MG/100ML; MG/100ML; MG/100ML; MG/100ML
100 INJECTION, SOLUTION INTRAVENOUS CONTINUOUS
Status: CANCELLED | OUTPATIENT
Start: 2022-03-11

## 2022-03-11 RX ORDER — PROPOFOL 10 MG/ML
VIAL (ML) INTRAVENOUS AS NEEDED
Status: DISCONTINUED | OUTPATIENT
Start: 2022-03-11 | End: 2022-03-11 | Stop reason: SURG

## 2022-03-11 RX ADMIN — PROPOFOL 50 MG: 10 INJECTION, EMULSION INTRAVENOUS at 12:35

## 2022-03-11 RX ADMIN — PROPOFOL 100 MG: 10 INJECTION, EMULSION INTRAVENOUS at 12:26

## 2022-03-11 RX ADMIN — PROPOFOL 50 MG: 10 INJECTION, EMULSION INTRAVENOUS at 12:45

## 2022-03-11 RX ADMIN — PROPOFOL 100 MG: 10 INJECTION, EMULSION INTRAVENOUS at 12:40

## 2022-03-11 RX ADMIN — LIDOCAINE HYDROCHLORIDE 80 MG: 20 INJECTION, SOLUTION INFILTRATION; PERINEURAL at 12:22

## 2022-03-11 RX ADMIN — PROPOFOL 100 MG: 10 INJECTION, EMULSION INTRAVENOUS at 12:22

## 2022-03-11 RX ADMIN — SODIUM CHLORIDE, POTASSIUM CHLORIDE, SODIUM LACTATE AND CALCIUM CHLORIDE 9 ML/HR: 600; 310; 30; 20 INJECTION, SOLUTION INTRAVENOUS at 11:51

## 2022-03-11 RX ADMIN — PROPOFOL 50 MG: 10 INJECTION, EMULSION INTRAVENOUS at 12:30

## 2022-03-11 NOTE — BRIEF OP NOTE
COLONOSCOPY WITH POLYPECTOMY  Progress Note    Julia Lujan  3/11/2022    Pre-op Diagnosis:   Screen for colon cancer [Z12.11]       Post-Op Diagnosis Codes:     * Screen for colon cancer [Z12.11]     * Colon polyp [K63.5]    Procedure/CPT® Codes:        Procedure(s):  COLONOSCOPY WITH POLYPECTOMY    Surgeon(s):  Clinton Hernandez MD    Anesthesia: Monitored Anesthesia Care    Staff:   Circulator: Linda Wilson RN  Scrub Person: Keyla Del Rio         Estimated Blood Loss: none    Urine Voided: * No values recorded between 3/11/2022 12:19 PM and 3/11/2022 12:50 PM *    Specimens:                Specimens     ID Source Type Tests Collected By Collected At Frozen?    A Large Intestine, Sigmoid Colon Polyp · TISSUE PATHOLOGY EXAM   Clinton Hernandez MD 3/11/22 1241     Description: polyps x 2    This specimen was not marked as sent.                Drains: * No LDAs found *    Findings: Colon to TI fair Prep  [id number]    Complications: None          Clinton Hernandez MD     Date: 3/11/2022  Time: 12:51 EST

## 2022-03-11 NOTE — ANESTHESIA PREPROCEDURE EVALUATION
Anesthesia Evaluation     Patient summary reviewed and Nursing notes reviewed   NPO Solid Status: > 8 hours  NPO Liquid Status: > 8 hours           Airway   Mallampati: III  TM distance: >3 FB  No difficulty expected  Dental - normal exam     Pulmonary - normal exam   (+) a smoker (quit 2yrs ago.) Former,   (-) pneumonia  Cardiovascular - normal exam  Exercise tolerance: good (4-7 METS)    (+) hypertension,       Neuro/Psych  (+) headaches, numbness (carpal tunnel),    GI/Hepatic/Renal/Endo - negative ROS     Musculoskeletal     (+) neck stiffness,   (-) neck pain      ROS comment: Cervical fusion  Abdominal  - normal exam   Substance History      OB/GYN          Other - negative ROS                       Anesthesia Plan    ASA 2     intravenous induction     Anesthetic plan, all risks, benefits, and alternatives have been provided, discussed and informed consent has been obtained with: patient.  Use of blood products discussed with patient  Consented to blood products.       CODE STATUS:

## 2022-03-11 NOTE — ANESTHESIA POSTPROCEDURE EVALUATION
Patient: Julia Lujan    Procedure Summary     Date: 03/11/22 Room / Location: [facility] ENDOSCOPY 1 /  [facility]    Anesthesia Start: 1218 Anesthesia Stop: 1250    Procedure: COLONOSCOPY WITH POLYPECTOMY (N/A ) Diagnosis:       Screen for colon cancer      Colon polyp      (Screen for colon cancer [Z12.11])    Surgeons: Clinton Hernandez MD Provider: Florian Mukherjee CRNA    Anesthesia Type: Not recorded ASA Status: 2          Anesthesia Type: No value filed.    Vitals  Vitals Value Taken Time   BP     Temp 97.7 °F (36.5 °C) 03/11/22 1251   Pulse     Resp     SpO2             Post Anesthesia Care and Evaluation    Patient location during evaluation: bedside  Patient participation: complete - patient participated  Level of consciousness: awake and alert  Pain score: 0  Pain management: adequate  Airway patency: patent  Anesthetic complications: No anesthetic complications  PONV Status: none  Cardiovascular status: acceptable  Respiratory status: acceptable  Hydration status: acceptable

## 2022-03-11 NOTE — H&P
Patient Care Team:  Anibal Fish MD (Tony) as PCP - General (Internal Medicine)    CHIEF COMPLAINT: Screening CRC    HISTORY OF PRESENT ILLNESS:  First exam    Past Medical History:   Diagnosis Date   • Cervicalgia    • Chest pain, unspecified    • Elevated blood pressure reading without diagnosis of hypertension    • Essential (primary) hypertension    • H/O mammogram 01/2019   • Headache    • Headache    • Hematuria, unspecified    • Lesion of sciatic nerve, left lower limb    • Major depressive disorder, single episode, unspecified    • Menopausal and female climacteric states    • Nevus, non-neoplastic    • Nontraumatic hematoma of soft tissue    • Other fatigue    • Pain in limb    • Pain in thoracic spine    • Papanicolaou smear 2017   • Pneumonia, unspecified organism    • Radiculopathy, cervical region    • Rib pain    • Unspecified injury of muscle(s) and tendon(s) of the rotator cuff of right shoulder, initial encounter      Past Surgical History:   Procedure Laterality Date   • HYSTERECTOMY     • NECK SURGERY       Family History   Problem Relation Age of Onset   • Lymphoma Mother    • Other Father         BLADDER CANCER   • Breast cancer Maternal Grandmother    • Other Maternal Uncle         BRACA GENE+     Social History     Tobacco Use   • Smoking status: Former Smoker   • Smokeless tobacco: Never Used   • Tobacco comment: QUIT 2019 STATED AGE 13   Vaping Use   • Vaping Use: Never used   Substance Use Topics   • Alcohol use: Yes     Comment: WINE 5X A WEEK   • Drug use: Yes     Types: Marijuana     Comment: ROUTINELY     Medications Prior to Admission   Medication Sig Dispense Refill Last Dose   • ascorbic acid (VITAMIN C) 100 MG tablet Take  by mouth.      • B Complex Vitamins (B COMPLEX 1 PO) Take  by mouth.      • baclofen (LIORESAL) 10 MG tablet TAKE TWO TABLETS BY MOUTH THREE TIMES A DAY AS NEEDED FOR MUSCLE SPASMS 30 tablet 2    • cholecalciferol (Cholecalciferol) 25 MCG (1000 UT)  tablet Take 1,000 Units by mouth Daily.      • estradiol (MINIVELLE, VIVELLE-DOT) 0.075 MG/24HR patch       • lisinopril-hydrochlorothiazide (PRINZIDE,ZESTORETIC) 20-12.5 MG per tablet Take 1.5 tablets by mouth Daily. 135 tablet 0    • Magnesium 100 MG capsule Take  by mouth.      • meloxicam (MOBIC) 15 MG tablet Take 1 tablet by mouth Daily. 30 tablet 0    • methylPREDNISolone (MEDROL) 4 MG dose pack Take as directed on package instructions. 1 each 0    • MILK THISTLE PO Take  by mouth.      • venlafaxine XR (EFFEXOR-XR) 150 MG 24 hr capsule TAKE ONE CAPSULE BY MOUTH DAILY WITH FOOD 90 capsule 0      Allergies:  Hydrocodone-acetaminophen and Prednisone    REVIEW OF SYSTEMS:  Please see the above history of present illness for pertinent positives and negatives.  The remainder of the patient's systems have been reviewed and are negative.     Vital Signs            Physical Exam:  Physical Exam   Constitutional: Patient appears well-developed and well-nourished and in no acute distress   HEENT:   Head: Normocephalic and atraumatic.   Eyes:  Pupils are equal, round, and reactive to light. EOM are intact. Sclerae are anicteric and non-injected.  Mouth and Throat: Patient has moist mucous membranes. Oropharynx is clear of any erythema or exudate.     Neck: Neck supple. No JVD present. No thyromegaly present. No lymphadenopathy present.  Cardiovascular: Regular rate, regular rhythm, S1 normal and S2 normal.  Exam reveals no gallop and no friction rub.  No murmur heard.  Pulmonary/Chest: Lungs are clear to auscultation bilaterally. No respiratory distress. No wheezes. No rhonchi. No rales.   Abdominal: Soft. Bowel sounds are normal. No distension and no mass. There is no hepatosplenomegaly. There is no tenderness.   Musculoskeletal: Normal Muscle tone  Extremities: No edema. Pulses are palpable in all 4 extremities.  Neurological: Patient is alert and oriented to person, place, and time. Cranial nerves II-XII are grossly  intact with no focal deficits.  Skin: Skin is warm. No rash noted. Nails show no clubbing.  No cyanosis or erythema.    Debilities/Disabilities Identified: None  Emotional Behavior: Appropriate     Results Review:   I reviewed the patient's new clinical results.    Lab Results (most recent)     None          Imaging Results (Most Recent)     None        reviewed    ECG/EMG Results (most recent)     None        reviewed    Assessment/Plan   Screening CRC/  colonoscopy      I discussed the patient's findings and my recommendations with patient.     Clinton Hernandez MD  03/11/22  11:47 EST    Time: 10 min prior to procedure.

## 2022-03-14 LAB
LAB AP CASE REPORT: NORMAL
PATH REPORT.FINAL DX SPEC: NORMAL
PATH REPORT.GROSS SPEC: NORMAL

## 2022-03-17 ENCOUNTER — OFFICE VISIT (OUTPATIENT)
Dept: ORTHOPEDIC SURGERY | Facility: CLINIC | Age: 52
End: 2022-03-17

## 2022-03-17 VITALS — WEIGHT: 171 LBS | BODY MASS INDEX: 29.19 KG/M2 | HEIGHT: 64 IN

## 2022-03-17 DIAGNOSIS — S83.232D COMPLEX TEAR OF MEDIAL MENISCUS OF LEFT KNEE AS CURRENT INJURY, SUBSEQUENT ENCOUNTER: ICD-10-CM

## 2022-03-17 DIAGNOSIS — M94.262 CHONDROMALACIA OF KNEE, LEFT: ICD-10-CM

## 2022-03-17 DIAGNOSIS — M75.41 SUBACROMIAL IMPINGEMENT OF RIGHT SHOULDER: ICD-10-CM

## 2022-03-17 DIAGNOSIS — S83.232A COMPLEX TEAR OF MEDIAL MENISCUS OF LEFT KNEE AS CURRENT INJURY, INITIAL ENCOUNTER: ICD-10-CM

## 2022-03-17 DIAGNOSIS — M75.121 COMPLETE TEAR OF RIGHT ROTATOR CUFF, UNSPECIFIED WHETHER TRAUMATIC: ICD-10-CM

## 2022-03-17 DIAGNOSIS — M75.21 BICEPS TENDINITIS OF RIGHT UPPER EXTREMITY: ICD-10-CM

## 2022-03-17 DIAGNOSIS — M25.511 RIGHT SHOULDER PAIN, UNSPECIFIED CHRONICITY: Primary | ICD-10-CM

## 2022-03-17 PROCEDURE — 99214 OFFICE O/P EST MOD 30 MIN: CPT | Performed by: ORTHOPAEDIC SURGERY

## 2022-03-23 ENCOUNTER — OFFICE VISIT (OUTPATIENT)
Dept: ORTHOPEDIC SURGERY | Facility: CLINIC | Age: 52
End: 2022-03-23

## 2022-03-23 VITALS
WEIGHT: 171 LBS | DIASTOLIC BLOOD PRESSURE: 84 MMHG | HEIGHT: 64 IN | HEART RATE: 66 BPM | SYSTOLIC BLOOD PRESSURE: 133 MMHG | BODY MASS INDEX: 29.19 KG/M2

## 2022-03-23 DIAGNOSIS — M75.21 BICEPS TENDINITIS OF RIGHT UPPER EXTREMITY: ICD-10-CM

## 2022-03-23 DIAGNOSIS — M75.41 SUBACROMIAL IMPINGEMENT OF RIGHT SHOULDER: ICD-10-CM

## 2022-03-23 DIAGNOSIS — M75.121 COMPLETE TEAR OF RIGHT ROTATOR CUFF, UNSPECIFIED WHETHER TRAUMATIC: Primary | ICD-10-CM

## 2022-03-23 DIAGNOSIS — M25.511 RIGHT SHOULDER PAIN, UNSPECIFIED CHRONICITY: ICD-10-CM

## 2022-03-23 PROCEDURE — 73030 X-RAY EXAM OF SHOULDER: CPT | Performed by: ORTHOPAEDIC SURGERY

## 2022-03-23 PROCEDURE — 99213 OFFICE O/P EST LOW 20 MIN: CPT | Performed by: ORTHOPAEDIC SURGERY

## 2022-03-23 RX ORDER — PREGABALIN 150 MG/1
150 CAPSULE ORAL ONCE
Status: CANCELLED | OUTPATIENT
Start: 2022-03-23 | End: 2022-03-23

## 2022-03-23 RX ORDER — ACETAMINOPHEN 325 MG/1
1000 TABLET ORAL ONCE
Status: CANCELLED | OUTPATIENT
Start: 2022-03-23 | End: 2022-03-23

## 2022-03-23 RX ORDER — MELOXICAM 7.5 MG/1
15 TABLET ORAL ONCE
Status: CANCELLED | OUTPATIENT
Start: 2022-03-23 | End: 2022-03-23

## 2022-03-23 NOTE — TELEPHONE ENCOUNTER
Rx Refill Note  Requested Prescriptions     Pending Prescriptions Disp Refills    meloxicam (MOBIC) 15 MG tablet [Pharmacy Med Name: MELOXICAM 15 MG TABLET] 30 tablet 0     Sig: TAKE ONE TABLET BY MOUTH DAILY      Last office visit with prescribing clinician: 3/23/2022      Next office visit with prescribing clinician: Visit date not found   Last Filled:02.17.2022    Complete tear of right rotator cuff       Julia Al MA  03/23/22, 16:48 EDT    {TIP  Encounters:    {TIP  Please add Last Relevant Lab Date if appropriate:  {TIP  Is Refill Pharmacy correct?:

## 2022-03-24 RX ORDER — MELOXICAM 15 MG/1
TABLET ORAL
Qty: 30 TABLET | Refills: 0 | Status: SHIPPED | OUTPATIENT
Start: 2022-03-24 | End: 2022-07-18

## 2022-03-28 PROBLEM — M75.21 BICEPS TENDINITIS OF RIGHT UPPER EXTREMITY: Status: ACTIVE | Noted: 2022-03-28

## 2022-03-28 PROBLEM — M75.121 COMPLETE TEAR OF RIGHT ROTATOR CUFF: Status: ACTIVE | Noted: 2022-03-28

## 2022-03-28 PROBLEM — M75.41 SUBACROMIAL IMPINGEMENT OF RIGHT SHOULDER: Status: ACTIVE | Noted: 2022-03-28

## 2022-03-29 ENCOUNTER — PREP FOR SURGERY (OUTPATIENT)
Dept: OTHER | Facility: HOSPITAL | Age: 52
End: 2022-03-29

## 2022-03-29 DIAGNOSIS — M75.41 SUBACROMIAL IMPINGEMENT OF RIGHT SHOULDER: Primary | ICD-10-CM

## 2022-03-29 RX ORDER — CEFAZOLIN SODIUM 2 G/50ML
2 SOLUTION INTRAVENOUS ONCE
Status: CANCELLED | OUTPATIENT
Start: 2022-03-29 | End: 2022-03-29

## 2022-04-04 ENCOUNTER — PRE-ADMISSION TESTING (OUTPATIENT)
Dept: PREADMISSION TESTING | Facility: HOSPITAL | Age: 52
End: 2022-04-04

## 2022-04-04 VITALS
HEIGHT: 64 IN | RESPIRATION RATE: 18 BRPM | DIASTOLIC BLOOD PRESSURE: 77 MMHG | OXYGEN SATURATION: 98 % | WEIGHT: 173 LBS | BODY MASS INDEX: 29.53 KG/M2 | HEART RATE: 73 BPM | SYSTOLIC BLOOD PRESSURE: 127 MMHG

## 2022-04-04 PROCEDURE — 80048 BASIC METABOLIC PNL TOTAL CA: CPT | Performed by: ORTHOPAEDIC SURGERY

## 2022-04-04 PROCEDURE — 85610 PROTHROMBIN TIME: CPT | Performed by: ORTHOPAEDIC SURGERY

## 2022-04-04 PROCEDURE — 93010 ELECTROCARDIOGRAM REPORT: CPT | Performed by: INTERNAL MEDICINE

## 2022-04-04 PROCEDURE — 93005 ELECTROCARDIOGRAM TRACING: CPT

## 2022-04-04 PROCEDURE — 85025 COMPLETE CBC W/AUTO DIFF WBC: CPT | Performed by: ORTHOPAEDIC SURGERY

## 2022-04-04 PROCEDURE — 85730 THROMBOPLASTIN TIME PARTIAL: CPT | Performed by: ORTHOPAEDIC SURGERY

## 2022-04-04 RX ORDER — MULTIPLE VITAMINS W/ MINERALS TAB 9MG-400MCG
1 TAB ORAL DAILY
COMMUNITY

## 2022-04-04 RX ORDER — PHENTERMINE HYDROCHLORIDE 30 MG/1
37.5 CAPSULE ORAL EVERY MORNING
COMMUNITY
End: 2022-07-18

## 2022-04-04 RX ORDER — TOPIRAMATE 25 MG/1
25 CAPSULE, COATED PELLETS ORAL DAILY
COMMUNITY
End: 2022-11-23 | Stop reason: DRUGHIGH

## 2022-04-04 RX ORDER — PERPHENAZINE 4 MG
TABLET ORAL DAILY
COMMUNITY

## 2022-04-04 RX ORDER — LISINOPRIL AND HYDROCHLOROTHIAZIDE 20; 12.5 MG/1; MG/1
TABLET ORAL
Qty: 45 TABLET | Refills: 1 | Status: SHIPPED | OUTPATIENT
Start: 2022-04-04

## 2022-04-04 NOTE — DISCHARGE INSTRUCTIONS
PRE-ADMISSION TESTING INSTRUCTIONS FOR ADULTS    Take these medications the morning of surgery with a small sip of water:      No aspirin, advil, aleve, ibuprofen, naproxen, diet pills, decongestants, or herbal/vitamins for a week prior to surgery.    General Instructions:    DO NOT EAT SOLID FOOD AFTER MIDNIGHT THE NIGHT BEFORE SURGERY. No gum, mints, or hard candy after midnight the night before surgery.  You may drink clear liquids the day of surgery up until 2 hours before your arrival time.  Clear liquids are liquids you can see through. Nothing RED in color.    Plain water    Sports drinks  Sodas     Gelatin (Jell-O)  Fruit juices without pulp such as white grape juice and apple juice  Popsicles that contain no fruit or yogurt  Tea or coffee (no cream or milk added)    It is beneficial for you to have a clear drink that contains carbohydrates just before you leave your house and before your fasting time begins.  We suggest a 20 ounce bottle of Gatorade or Powerade for non-diabetic patients or a 20 ounce bottle of G2 or Powerade Zero for diabetic patients.     Patients who avoid smoking, chewing tobacco and alcohol for 4 weeks prior to surgery have a reduced risk of post-operative complications.  If at all possible, quit smoking as many days before surgery as you can.    Do not smoke, use chewing tobacco or drink alcohol the day of surgery    Bring your C-PAP/ BI-PAP machine if you use one.  Wear clean comfortable clothes and socks.  Do not wear contact lenses, lotion, deodorant, or make-up.  Bring a case for your glasses if applicable. You may brush your teeth the morning of surgery.  You may wear dentures/partials, do not put adhesive/glue on them.    Leave all other jewelry and valuables at home.      Preventing a Surgical Site Infection:    Shower the night before and on the morning of surgery using the chlorhexidine soap you were given.  Use a clean washcloth with the soap.  Place clean sheets on your bed  after showering the night before surgery. Do not use the CHG soap on your hair, face, or private areas. Wash your body gently for five (5) minutes. Do not scrub your skin.  Dry with a clean towel and dress in clean clothing.    Do not shave the surgical area for 10 days-2 weeks prior to surgery  because the razor can irritate skin and make it easier to develop an infection.  Make sure you, your family, and all healthcare providers clean their hands with soap and water or an alcohol based hand  before caring for you or your wound.      Day of surgery:    Your surgeon’s office will advise you of your arrival time for the day of surgery.    Upon arrival, a Pre-op nurse and Anesthesia provider will review your health history, obtain vital signs, and answer questions you may have.  The only belongings needed at this time will be your home medications and if applicable your C-PAP/BI-PAP machine.  If you are staying overnight your family can leave the rest of your belongings in the car and bring them to your room later.  A Pre-op nurse will start an IV and you may receive medication in preparation for surgery, including something to help you relax.  Your family will be able to see you in the Pre-op area.  While you are in surgery your family should notify the waiting room  if they leave the waiting room area and provide a contact phone number.    IF you have any questions, you can call the Pre-Admission Department at [phone] or your surgeon's office.  Notify your surgeon if  you become sick, have a fever, productive cough, or cannot be here the day of surgery    Please be aware that surgery does come with discomfort.  We want to make every effort to control your discomfort so please discuss any uncontrolled symptoms with your nurse.   Your doctor will most likely have prescribed pain medications.      If you are going home after surgery, you will receive individualized written care instructions  before being discharged.  A responsible adult (over the age of 18) must drive you to and from the hospital on the day of your surgery and stay with you for 24 hours after anesthesia.    If you are staying overnight following surgery, you will be transported to your hospital room following the recovery period.  [facility] has all private rooms.    You may receive a survey regarding the care you received. Your feedback is very important and will be used to collect the necessary data to help us to continue to provide excellent care.     Deductibles and co-payments are collected on the day of service. Please be prepared to pay the required co-pay, deductible or deposit on the day of service as defined by your plan.

## 2022-04-05 LAB — QT INTERVAL: 424 MS

## 2022-04-09 ENCOUNTER — LAB (OUTPATIENT)
Dept: LAB | Facility: HOSPITAL | Age: 52
End: 2022-04-09

## 2022-04-09 PROCEDURE — 87635 SARS-COV-2 COVID-19 AMP PRB: CPT | Performed by: ORTHOPAEDIC SURGERY

## 2022-04-11 ENCOUNTER — ANESTHESIA EVENT (OUTPATIENT)
Dept: PERIOP | Facility: HOSPITAL | Age: 52
End: 2022-04-11

## 2022-04-11 NOTE — H&P
" History & Physical       Patient: Julia Lujan    YOB: 1970    Medical Record Number: [id number]    Surgeon:  Dr. Kale Wilkerson    Chief Complaints: Right shoulder pain, rotator cuff tear      History of Present Illness: 51 y.o. female presents with right shoulder pain and rotator cuff tear. Onset of symptoms was greater than 1 year ago and has been progressively worsening despite more conservative treatment measures.  Symptoms are associated with ability to move, lift, push, pull, and perform activities of daily living with affected extremity. Symptoms are aggravated by overhead motion, lifting, and pushing as well as ROM necessary for activities of daily living.   Symptoms improve with rest, ice and elevation only minimally.      Allergies:   Allergies   Allergen Reactions   • Hydrocodone-Acetaminophen Rash     \"Hyper\"   • Prednisone Rash       Medications:   Home Medications:  No current facility-administered medications on file prior to encounter.     Current Outpatient Medications on File Prior to Encounter   Medication Sig   • estradiol (MINIVELLE, VIVELLE-DOT) 0.075 MG/24HR patch    • lisinopril-hydrochlorothiazide (PRINZIDE,ZESTORETIC) 20-12.5 MG per tablet TAKE 1 AND 1/2 TABLET BY MOUTH DAILY   • meloxicam (MOBIC) 15 MG tablet TAKE ONE TABLET BY MOUTH DAILY (Patient taking differently: Take 15 mg by mouth Daily.)   • venlafaxine XR (EFFEXOR-XR) 150 MG 24 hr capsule TAKE ONE CAPSULE BY MOUTH DAILY WITH FOOD (Patient taking differently: Take 150 mg by mouth Daily.)   • [DISCONTINUED] lisinopril-hydrochlorothiazide (PRINZIDE,ZESTORETIC) 20-12.5 MG per tablet Take 1.5 tablets by mouth Daily.     Current Medications:  Scheduled Meds:  Continuous Infusions:lactated ringers, 9 mL/hr, Last Rate: 9 mL/hr (04/12/22 1136)      PRN Meds:.    I have reviewed the patient's medical history in detail and updated the computerized patient record.  Review and summarization of old records include:    Past " Medical History:   Diagnosis Date   • Arthritis     neck and ble   • Cervicalgia    • Elevated blood pressure reading without diagnosis of hypertension    • Essential (primary) hypertension    • H/O mammogram 01/2019   • Headache    • Hematuria, unspecified    • Major depressive disorder, single episode, unspecified    • Menopausal and female climacteric states    • Nevus, non-neoplastic    • Pain in thoracic spine    • Papanicolaou smear 2017   • Pneumonia, unspecified organism    • Radiculopathy, cervical region    • Rib pain    • Unspecified injury of muscle(s) and tendon(s) of the rotator cuff of right shoulder, initial encounter         Past Surgical History:   Procedure Laterality Date   • BREAST IMPLANT SURGERY Bilateral 2000   • COLONOSCOPY W/ POLYPECTOMY N/A 03/11/2022    Procedure: COLONOSCOPY WITH POLYPECTOMY;  Surgeon: Clinton Hernandez MD;  Location: [facility];  Service: Gastroenterology;  Laterality: N/A;  sigmoid polyps x 2     • HYSTERECTOMY     • LIPOSUCTION      bilateral thighs   [facility]        Social History     Occupational History   • Occupation:    Tobacco Use   • Smoking status: Former Smoker     Packs/day: 0.50     Years: 25.00     Pack years: 12.50     Quit date: 04/2019     Years since quitting: 3.0   • Smokeless tobacco: Never Used   • Tobacco comment: QUIT 2019 STATED AGE 13   Vaping Use   • Vaping Use: Never used   Substance and Sexual Activity   • Alcohol use: Yes     Comment: WINE 5X A WEEK   • Drug use: Yes     Types: Marijuana     Comment: ROUTINELY   • Sexual activity: Defer      Social History     Social History Narrative   • Not on file        Family History   Problem Relation Age of Onset   • Lymphoma Mother    • Other Father         BLADDER CANCER   • Breast cancer Maternal Grandmother    • Other Maternal Uncle         BRACA GENE+       ROS: 14 point review of systems was performed and was negative except for documented  "findings in HPI and today's encounter.     Allergies:   Allergies   Allergen Reactions   • Hydrocodone-Acetaminophen Rash     \"Hyper\"   • Prednisone Rash     Constitutional:  Denies fever, shaking or chills   Eyes:  Denies change in visual acuity   HENT:  Denies nasal congestion or sore throat   Respiratory:  Denies cough or shortness of breath   Cardiovascular:  Denies chest pain or severe LE edema   GI:  Denies abdominal pain, nausea, vomiting, bloody stools or diarrhea   Musculoskeletal:  Denies numbness tingling or loss of motor function except as outlined above in history of present illness.  : Denies painful urination or hematuria  Integument:  Denies rash, lesion or ulceration   Neurologic:  Denies headache or focal weakness  Endocrine:  Denies lymphadenopathy  Psych:  Denies confusion or change in mental status   Hem:  Denies active bleeding    Physical Exam: 51 y.o. female  Body mass index is 29.39 kg/m².  Vitals:    04/12/22 1127   BP: 123/93   Pulse: 77   Resp: 12   Temp: 98.6 °F (37 °C)   SpO2: 96%     Vital signs reviewed.   General Appearance:    Alert, cooperative, in no acute distress                  Eyes: conjunctiva clear  ENT: external ears and nose atraumatic  CV: no peripheral edema  Resp: normal respiratory effort  Skin: no rashes or wounds; normal turgor  Psych: mood and affect appropriate  Lymph: no nodes appreciated  Neuro: gross sensation intact  Vascular:  Palpable peripheral pulse in noted extremity  Musculoskeletal Extremities: Right shoulder-     FF- Active- 170  Strength- 4/5     ER- Active- 55  Strength- 4/5     IR T10  Belly Press Strength- 4+/5     Bear hug sign- Negative  Empty can test- Positive  Neer's sign- Mildly positive  Blakely- Mildly positive  Cross arm test- Mildly positive  Tenderness over AC joint- Mildly positive  Yergason- Positive  Speeds- Negative  [place]'s- Positive  2+ radial pulse  Positive sensation in all distributions, right hand  Positive deltoid firing " in all three components    Debilities/Disabilities Identified: None      Diagnostic Tests:  Admission on 04/12/2022   Component Date Value Ref Range Status   • HCG Qualitative 04/12/2022 Negative  Negative Final   Pre-Admission Testing on 04/04/2022   Component Date Value Ref Range Status   • QT Interval 04/04/2022 424  ms Final     No results found.    Assessment:  Patient Active Problem List   Diagnosis   • Essential (primary) hypertension   • Major depressive disorder, single episode, unspecified   • Screen for colon cancer   • Complex tear of medial meniscus of left knee as current injury   • Complete tear of right rotator cuff   • Subacromial impingement of right shoulder   • Biceps tendinitis of right upper extremity       Plan:   Patient wishes to proceed with right shoulder arthroscopy, rotator cuff debridement versus repair, possible biceps tenodesis, subacromial decompression, and all associated procedures.  I reviewed risks benefits and alternatives the procedure with risks including not limited to neurovascular damage, bleeding, infection, chronic pain, re-tear rotator cuff, failure of healing rotator cuff, loss of motion, weakness, stiffness, instability, biceps sag, DVT, pulmonary embolus, death, stroke, complex regional pain syndrome, myocardial infarction, need for additional procedures. She understood all these had all questions answered.  Patient consented to proceed with surgery.  No guarantees were given regarding results of surgery.      Julia Lujan was given the opportunity to ask and have all questions answered today.  The patient voiced understanding of the risks, benefits, and alternative forms of treatment that were discussed and the patient consents to proceed with surgery.     Discharge Plan: to home in  Care of family/friend    Kale Wilkerson MD      Dragon transcription disclaimer     Much of this encounter note is an electronic transcription/translation of spoken language to  printed text. The electronic translation of spoken language may permit erroneous, or at times, nonsensical words or phrases to be inadvertently transcribed. Although I have reviewed the note for such errors, some may still exist.

## 2022-04-12 ENCOUNTER — ANESTHESIA (OUTPATIENT)
Dept: PERIOP | Facility: HOSPITAL | Age: 52
End: 2022-04-12

## 2022-04-12 ENCOUNTER — HOSPITAL ENCOUNTER (OUTPATIENT)
Facility: HOSPITAL | Age: 52
Setting detail: HOSPITAL OUTPATIENT SURGERY
Discharge: HOME | End: 2022-04-12
Attending: ORTHOPAEDIC SURGERY | Admitting: ORTHOPAEDIC SURGERY

## 2022-04-12 VITALS
TEMPERATURE: 97.9 F | WEIGHT: 171.2 LBS | HEIGHT: 64 IN | DIASTOLIC BLOOD PRESSURE: 65 MMHG | HEART RATE: 72 BPM | BODY MASS INDEX: 29.23 KG/M2 | OXYGEN SATURATION: 97 % | SYSTOLIC BLOOD PRESSURE: 116 MMHG | RESPIRATION RATE: 12 BRPM

## 2022-04-12 DIAGNOSIS — M75.41 SUBACROMIAL IMPINGEMENT OF RIGHT SHOULDER: ICD-10-CM

## 2022-04-12 DIAGNOSIS — M75.21 BICEPS TENDINITIS OF RIGHT UPPER EXTREMITY: ICD-10-CM

## 2022-04-12 DIAGNOSIS — M75.121 COMPLETE TEAR OF RIGHT ROTATOR CUFF, UNSPECIFIED WHETHER TRAUMATIC: ICD-10-CM

## 2022-04-12 LAB — HCG SERPL QL: NEGATIVE

## 2022-04-12 PROCEDURE — 29827 SHO ARTHRS SRG RT8TR CUF RPR: CPT | Performed by: ORTHOPAEDIC SURGERY

## 2022-04-12 PROCEDURE — 84703 CHORIONIC GONADOTROPIN ASSAY: CPT | Performed by: ORTHOPAEDIC SURGERY

## 2022-04-12 PROCEDURE — C1763 CONN TISS, NON-HUMAN: HCPCS | Performed by: ORTHOPAEDIC SURGERY

## 2022-04-12 PROCEDURE — 25010000002 EPINEPHRINE PER 0.1 MG: Performed by: ORTHOPAEDIC SURGERY

## 2022-04-12 PROCEDURE — 25010000002 SUCCINYLCHOLINE PER 20 MG: Performed by: ANESTHESIOLOGY

## 2022-04-12 PROCEDURE — C1713 ANCHOR/SCREW BN/BN,TIS/BN: HCPCS | Performed by: ORTHOPAEDIC SURGERY

## 2022-04-12 PROCEDURE — 25010000002 MIDAZOLAM PER 1MG: Performed by: NURSE ANESTHETIST, CERTIFIED REGISTERED

## 2022-04-12 PROCEDURE — 25010000002 ONDANSETRON PER 1 MG: Performed by: NURSE ANESTHETIST, CERTIFIED REGISTERED

## 2022-04-12 PROCEDURE — 0 CEFAZOLIN SODIUM-DEXTROSE 2-3 GM-%(50ML) RECONSTITUTED SOLUTION: Performed by: ORTHOPAEDIC SURGERY

## 2022-04-12 PROCEDURE — 29827 SHO ARTHRS SRG RT8TR CUF RPR: CPT | Performed by: SPECIALIST/TECHNOLOGIST, OTHER

## 2022-04-12 PROCEDURE — 23430 REPAIR BICEPS TENDON: CPT | Performed by: ORTHOPAEDIC SURGERY

## 2022-04-12 PROCEDURE — 23430 REPAIR BICEPS TENDON: CPT | Performed by: SPECIALIST/TECHNOLOGIST, OTHER

## 2022-04-12 PROCEDURE — 25010000002 PROPOFOL 10 MG/ML EMULSION: Performed by: ANESTHESIOLOGY

## 2022-04-12 DEVICE — HEALICOIL PK 5.5 MM SUTURE ANCHOR                                    WITH THREE ULTRABRAID NO.2 SUTURES                                    BLUE, BLUE-COBRAID, COBRAID-BLACK STERILE
Type: IMPLANTABLE DEVICE | Site: SHOULDER | Status: FUNCTIONAL
Brand: HEALICOIL

## 2022-04-12 DEVICE — ANCHR TNDN REGENETEN TISS/SFT EA/8: Type: IMPLANTABLE DEVICE | Site: SHOULDER | Status: FUNCTIONAL

## 2022-04-12 DEVICE — MULTIFIX S-ULTRA 5.5MM KNOTLESS ANCHOR
Type: IMPLANTABLE DEVICE | Site: SHOULDER | Status: FUNCTIONAL
Brand: MULTIFIX

## 2022-04-12 DEVICE — 2.8MM Q-FIX ALL SUTURE ANCHOR
Type: IMPLANTABLE DEVICE | Site: SHOULDER | Status: FUNCTIONAL
Brand: Q-FIX

## 2022-04-12 DEVICE — IMPLANTABLE DEVICE
Type: IMPLANTABLE DEVICE | Site: SHOULDER | Status: FUNCTIONAL
Brand: BIOINDUCTIVE IMPLANT WITH ARTHROSCOPIC DELIVERY SYSTEM - LARGE

## 2022-04-12 DEVICE — BONE ANCHORS 3 WITH ARTHROSCOPIC DELIVERY SYSTEM ADVANCED
Type: IMPLANTABLE DEVICE | Site: SHOULDER | Status: FUNCTIONAL
Brand: BONE ANCHORS WITH ARTHROSCOPIC DELIVERY SYSTEM - ADVANCED

## 2022-04-12 DEVICE — ULTRABRAID NO.2 WHITE SUTURE AND                                    NEEDLE ASSEMBLY, 38 INCH, 10 PER                                    BOX, STERILE
Type: IMPLANTABLE DEVICE | Site: SHOULDER | Status: FUNCTIONAL
Brand: ULTRABRAID

## 2022-04-12 RX ORDER — PROPOFOL 10 MG/ML
VIAL (ML) INTRAVENOUS AS NEEDED
Status: DISCONTINUED | OUTPATIENT
Start: 2022-04-12 | End: 2022-04-12 | Stop reason: SURG

## 2022-04-12 RX ORDER — FENTANYL CITRATE 50 UG/ML
50 INJECTION, SOLUTION INTRAMUSCULAR; INTRAVENOUS
Status: DISCONTINUED | OUTPATIENT
Start: 2022-04-12 | End: 2022-04-12 | Stop reason: HOSPADM

## 2022-04-12 RX ORDER — MIDAZOLAM HYDROCHLORIDE 2 MG/2ML
1 INJECTION, SOLUTION INTRAMUSCULAR; INTRAVENOUS
Status: DISCONTINUED | OUTPATIENT
Start: 2022-04-12 | End: 2022-04-12 | Stop reason: HOSPADM

## 2022-04-12 RX ORDER — SODIUM CHLORIDE 9 MG/ML
40 INJECTION, SOLUTION INTRAVENOUS AS NEEDED
Status: DISCONTINUED | OUTPATIENT
Start: 2022-04-12 | End: 2022-04-12 | Stop reason: HOSPADM

## 2022-04-12 RX ORDER — SODIUM CHLORIDE, SODIUM LACTATE, POTASSIUM CHLORIDE, CALCIUM CHLORIDE 600; 310; 30; 20 MG/100ML; MG/100ML; MG/100ML; MG/100ML
100 INJECTION, SOLUTION INTRAVENOUS CONTINUOUS
Status: DISCONTINUED | OUTPATIENT
Start: 2022-04-12 | End: 2022-04-12 | Stop reason: HOSPADM

## 2022-04-12 RX ORDER — ONDANSETRON 2 MG/ML
4 INJECTION INTRAMUSCULAR; INTRAVENOUS ONCE AS NEEDED
Status: DISCONTINUED | OUTPATIENT
Start: 2022-04-12 | End: 2022-04-12 | Stop reason: HOSPADM

## 2022-04-12 RX ORDER — MELOXICAM 7.5 MG/1
15 TABLET ORAL ONCE
Status: COMPLETED | OUTPATIENT
Start: 2022-04-12 | End: 2022-04-12

## 2022-04-12 RX ORDER — PREGABALIN 75 MG/1
150 CAPSULE ORAL ONCE
Status: COMPLETED | OUTPATIENT
Start: 2022-04-12 | End: 2022-04-12

## 2022-04-12 RX ORDER — ONDANSETRON 4 MG/1
4 TABLET, FILM COATED ORAL EVERY 8 HOURS PRN
Qty: 30 TABLET | Refills: 0 | Status: SHIPPED | OUTPATIENT
Start: 2022-04-12 | End: 2022-07-18

## 2022-04-12 RX ORDER — SODIUM CHLORIDE, SODIUM LACTATE, POTASSIUM CHLORIDE, CALCIUM CHLORIDE 600; 310; 30; 20 MG/100ML; MG/100ML; MG/100ML; MG/100ML
9 INJECTION, SOLUTION INTRAVENOUS CONTINUOUS PRN
Status: DISCONTINUED | OUTPATIENT
Start: 2022-04-12 | End: 2022-04-12 | Stop reason: HOSPADM

## 2022-04-12 RX ORDER — DEXMEDETOMIDINE HYDROCHLORIDE 100 UG/ML
INJECTION, SOLUTION INTRAVENOUS AS NEEDED
Status: DISCONTINUED | OUTPATIENT
Start: 2022-04-12 | End: 2022-04-12 | Stop reason: SURG

## 2022-04-12 RX ORDER — FENTANYL CITRATE 50 UG/ML
25 INJECTION, SOLUTION INTRAMUSCULAR; INTRAVENOUS
Status: DISCONTINUED | OUTPATIENT
Start: 2022-04-12 | End: 2022-04-12 | Stop reason: HOSPADM

## 2022-04-12 RX ORDER — FAMOTIDINE 10 MG/ML
20 INJECTION, SOLUTION INTRAVENOUS
Status: COMPLETED | OUTPATIENT
Start: 2022-04-12 | End: 2022-04-12

## 2022-04-12 RX ORDER — ONDANSETRON 2 MG/ML
4 INJECTION INTRAMUSCULAR; INTRAVENOUS ONCE AS NEEDED
Status: COMPLETED | OUTPATIENT
Start: 2022-04-12 | End: 2022-04-12

## 2022-04-12 RX ORDER — SODIUM CHLORIDE 0.9 % (FLUSH) 0.9 %
10 SYRINGE (ML) INJECTION EVERY 12 HOURS SCHEDULED
Status: DISCONTINUED | OUTPATIENT
Start: 2022-04-12 | End: 2022-04-12 | Stop reason: HOSPADM

## 2022-04-12 RX ORDER — SODIUM CHLORIDE 0.9 % (FLUSH) 0.9 %
10 SYRINGE (ML) INJECTION AS NEEDED
Status: DISCONTINUED | OUTPATIENT
Start: 2022-04-12 | End: 2022-04-12 | Stop reason: HOSPADM

## 2022-04-12 RX ORDER — ASPIRIN 81 MG/1
81 TABLET ORAL DAILY
Qty: 14 TABLET | Refills: 0 | Status: SHIPPED | OUTPATIENT
Start: 2022-04-12 | End: 2022-07-18

## 2022-04-12 RX ORDER — OXYCODONE HYDROCHLORIDE AND ACETAMINOPHEN 5; 325 MG/1; MG/1
1 TABLET ORAL EVERY 4 HOURS PRN
Qty: 42 TABLET | Refills: 0 | Status: SHIPPED | OUTPATIENT
Start: 2022-04-12 | End: 2022-04-20 | Stop reason: SDUPTHER

## 2022-04-12 RX ORDER — PROPOFOL 10 MG/ML
VIAL (ML) INTRAVENOUS AS NEEDED
Status: DISCONTINUED | OUTPATIENT
Start: 2022-04-12 | End: 2022-04-12

## 2022-04-12 RX ORDER — OXYCODONE HYDROCHLORIDE AND ACETAMINOPHEN 5; 325 MG/1; MG/1
1 TABLET ORAL ONCE AS NEEDED
Status: DISCONTINUED | OUTPATIENT
Start: 2022-04-12 | End: 2022-04-12 | Stop reason: HOSPADM

## 2022-04-12 RX ORDER — ACETAMINOPHEN 500 MG
1000 TABLET ORAL ONCE
Status: COMPLETED | OUTPATIENT
Start: 2022-04-12 | End: 2022-04-12

## 2022-04-12 RX ORDER — LIDOCAINE HYDROCHLORIDE AND EPINEPHRINE 10; 10 MG/ML; UG/ML
INJECTION, SOLUTION INFILTRATION; PERINEURAL AS NEEDED
Status: DISCONTINUED | OUTPATIENT
Start: 2022-04-12 | End: 2022-04-12 | Stop reason: HOSPADM

## 2022-04-12 RX ORDER — SUCCINYLCHOLINE CHLORIDE 20 MG/ML
INJECTION INTRAMUSCULAR; INTRAVENOUS AS NEEDED
Status: DISCONTINUED | OUTPATIENT
Start: 2022-04-12 | End: 2022-04-12 | Stop reason: SURG

## 2022-04-12 RX ORDER — SENNA PLUS 8.6 MG/1
1 TABLET ORAL NIGHTLY
Qty: 20 TABLET | Refills: 0 | Status: SHIPPED | OUTPATIENT
Start: 2022-04-12 | End: 2022-07-18

## 2022-04-12 RX ORDER — MIDAZOLAM HYDROCHLORIDE 2 MG/2ML
0.5 INJECTION, SOLUTION INTRAMUSCULAR; INTRAVENOUS
Status: DISCONTINUED | OUTPATIENT
Start: 2022-04-12 | End: 2022-04-12 | Stop reason: HOSPADM

## 2022-04-12 RX ORDER — BUPIVACAINE HYDROCHLORIDE 5 MG/ML
INJECTION, SOLUTION EPIDURAL; INTRACAUDAL
Status: COMPLETED | OUTPATIENT
Start: 2022-04-12 | End: 2022-04-12

## 2022-04-12 RX ORDER — LIDOCAINE HYDROCHLORIDE 20 MG/ML
INJECTION, SOLUTION INFILTRATION; PERINEURAL AS NEEDED
Status: DISCONTINUED | OUTPATIENT
Start: 2022-04-12 | End: 2022-04-12 | Stop reason: SURG

## 2022-04-12 RX ORDER — KETAMINE HYDROCHLORIDE 10 MG/ML
INJECTION INTRAMUSCULAR; INTRAVENOUS AS NEEDED
Status: DISCONTINUED | OUTPATIENT
Start: 2022-04-12 | End: 2022-04-12 | Stop reason: SURG

## 2022-04-12 RX ORDER — ROCURONIUM BROMIDE 10 MG/ML
INJECTION, SOLUTION INTRAVENOUS AS NEEDED
Status: DISCONTINUED | OUTPATIENT
Start: 2022-04-12 | End: 2022-04-12 | Stop reason: SURG

## 2022-04-12 RX ORDER — LIDOCAINE HYDROCHLORIDE 10 MG/ML
0.5 INJECTION, SOLUTION EPIDURAL; INFILTRATION; INTRACAUDAL; PERINEURAL ONCE AS NEEDED
Status: COMPLETED | OUTPATIENT
Start: 2022-04-12 | End: 2022-04-12

## 2022-04-12 RX ORDER — LIDOCAINE HYDROCHLORIDE 20 MG/ML
INJECTION, SOLUTION INFILTRATION; PERINEURAL AS NEEDED
Status: DISCONTINUED | OUTPATIENT
Start: 2022-04-12 | End: 2022-04-12

## 2022-04-12 RX ORDER — CEFAZOLIN SODIUM 2 G/50ML
2 SOLUTION INTRAVENOUS ONCE
Status: COMPLETED | OUTPATIENT
Start: 2022-04-12 | End: 2022-04-12

## 2022-04-12 RX ORDER — SODIUM CHLORIDE 9 MG/ML
INJECTION, SOLUTION INTRAVENOUS AS NEEDED
Status: DISCONTINUED | OUTPATIENT
Start: 2022-04-12 | End: 2022-04-12 | Stop reason: HOSPADM

## 2022-04-12 RX ADMIN — KETAMINE HYDROCHLORIDE 10 MG: 10 INJECTION, SOLUTION INTRAMUSCULAR; INTRAVENOUS at 16:16

## 2022-04-12 RX ADMIN — ACETAMINOPHEN 1000 MG: 500 TABLET ORAL at 11:46

## 2022-04-12 RX ADMIN — CEFAZOLIN SODIUM 2 G: 2 SOLUTION INTRAVENOUS at 15:20

## 2022-04-12 RX ADMIN — ROCURONIUM BROMIDE 5 MG: 10 INJECTION INTRAVENOUS at 15:11

## 2022-04-12 RX ADMIN — SUCCINYLCHOLINE CHLORIDE 120 MG: 20 INJECTION, SOLUTION INTRAMUSCULAR; INTRAVENOUS at 15:14

## 2022-04-12 RX ADMIN — LIDOCAINE HYDROCHLORIDE 100 MG: 20 INJECTION, SOLUTION INFILTRATION; PERINEURAL at 15:14

## 2022-04-12 RX ADMIN — MELOXICAM 15 MG: 7.5 TABLET ORAL at 11:46

## 2022-04-12 RX ADMIN — BUPIVACAINE HYDROCHLORIDE 15 ML: 5 INJECTION, SOLUTION EPIDURAL; INTRACAUDAL; PERINEURAL at 13:26

## 2022-04-12 RX ADMIN — FAMOTIDINE 20 MG: 10 INJECTION, SOLUTION INTRAVENOUS at 11:46

## 2022-04-12 RX ADMIN — LIDOCAINE HYDROCHLORIDE 0.5 ML: 10 INJECTION, SOLUTION EPIDURAL; INFILTRATION; INTRACAUDAL; PERINEURAL at 11:36

## 2022-04-12 RX ADMIN — KETAMINE HYDROCHLORIDE 20 MG: 10 INJECTION, SOLUTION INTRAMUSCULAR; INTRAVENOUS at 15:22

## 2022-04-12 RX ADMIN — PROPOFOL 150 MG: 10 INJECTION, EMULSION INTRAVENOUS at 15:14

## 2022-04-12 RX ADMIN — MIDAZOLAM HYDROCHLORIDE 1 MG: 1 INJECTION, SOLUTION INTRAMUSCULAR; INTRAVENOUS at 13:34

## 2022-04-12 RX ADMIN — DEXMEDETOMIDINE 20 MCG: 100 INJECTION, SOLUTION, CONCENTRATE INTRAVENOUS at 15:14

## 2022-04-12 RX ADMIN — SODIUM CHLORIDE, POTASSIUM CHLORIDE, SODIUM LACTATE AND CALCIUM CHLORIDE 9 ML/HR: 600; 310; 30; 20 INJECTION, SOLUTION INTRAVENOUS at 11:36

## 2022-04-12 RX ADMIN — KETAMINE HYDROCHLORIDE 10 MG: 10 INJECTION, SOLUTION INTRAMUSCULAR; INTRAVENOUS at 17:18

## 2022-04-12 RX ADMIN — ONDANSETRON 4 MG: 2 INJECTION INTRAMUSCULAR; INTRAVENOUS at 11:46

## 2022-04-12 RX ADMIN — SODIUM CHLORIDE, POTASSIUM CHLORIDE, SODIUM LACTATE AND CALCIUM CHLORIDE: 600; 310; 30; 20 INJECTION, SOLUTION INTRAVENOUS at 15:06

## 2022-04-12 RX ADMIN — PREGABALIN 150 MG: 75 CAPSULE ORAL at 11:46

## 2022-04-12 RX ADMIN — MIDAZOLAM HYDROCHLORIDE 0.5 MG: 1 INJECTION, SOLUTION INTRAMUSCULAR; INTRAVENOUS at 13:19

## 2022-04-12 NOTE — ANESTHESIA POSTPROCEDURE EVALUATION
Patient: Julia Lujan    Procedure Summary     Date: 04/12/22 Room / Location:  [facility] 1 /  [facility]    Anesthesia Start: 1506 Anesthesia Stop: 1827    Procedure: SHOULDER ARTHROSCOPY WITH ROTATOR CUFF REPAIR COMPLEX WITH BIOINDUCTIVE IMPLANT, BICEPS TENDONESIS (Right Shoulder) Diagnosis:       Complete tear of right rotator cuff, unspecified whether traumatic      Subacromial impingement of right shoulder      Biceps tendinitis of right upper extremity      (Complete tear of right rotator cuff, unspecified whether traumatic [M75.121])      (Subacromial impingement of right shoulder [M75.41])      (Biceps tendinitis of right upper extremity [M75.21])    Surgeons: Kale Wilkerson MD Provider: Grecia Cesar MD    Anesthesia Type: general ASA Status: 2          Anesthesia Type: general    Vitals  Vitals Value Taken Time   /70 04/12/22 1900   Temp 97.9 °F (36.6 °C) 04/12/22 1835   Pulse 70 04/12/22 1901   Resp 12 04/12/22 1855   SpO2 96 % 04/12/22 1901   Vitals shown include unvalidated device data.        Post Anesthesia Care and Evaluation    Patient location during evaluation: PHASE II  Patient participation: complete - patient participated  Level of consciousness: awake and alert  Pain score: 0  Pain management: satisfactory to patient  Airway patency: patent  Anesthetic complications: No anesthetic complications  PONV Status: none  Cardiovascular status: acceptable  Respiratory status: acceptable  Hydration status: acceptable

## 2022-04-12 NOTE — ANESTHESIA PROCEDURE NOTES
Airway  Urgency: elective    Date/Time: 4/12/2022 3:16 PM  Airway not difficult    General Information and Staff    Patient location during procedure: OR  Anesthesiologist: Grecia Cesar MD    Indications and Patient Condition  Indications for airway management: airway protection    Preoxygenated: yes  MILS maintained throughout  Mask difficulty assessment: 1 - vent by mask    Final Airway Details  Final airway type: endotracheal airway      Successful airway: ETT  Cuffed: yes   Successful intubation technique: direct laryngoscopy  Facilitating devices/methods: intubating stylet  Endotracheal tube insertion site: oral  Blade: Guerrier  Blade size: 3  ETT size (mm): 7.5  Cormack-Lehane Classification: grade IIa - partial view of glottis  Placement verified by: chest auscultation and capnometry   Cuff volume (mL): 5  Measured from: lips  ETT/EBT  to lips (cm): 21  Number of attempts at approach: 1  Assessment: lips, teeth, and gum same as pre-op and atraumatic intubation

## 2022-04-12 NOTE — ANESTHESIA PREPROCEDURE EVALUATION
Anesthesia Evaluation     Patient summary reviewed and Nursing notes reviewed   no history of anesthetic complications:  NPO Solid Status: > 8 hours  NPO Liquid Status: > 2 hours           Airway   Mallampati: II  TM distance: >3 FB  Neck ROM: full  No difficulty expected  Dental      Pulmonary     breath sounds clear to auscultation  Cardiovascular   Exercise tolerance: good (4-7 METS)    ECG reviewed  Rhythm: regular  Rate: normal    (+) hypertension well controlled less than 2 medications,     ROS comment: Narrative & Impression      HEART RATE= 63  bpm  RR Interval= 952  ms  SC Interval= 148  ms  P Horizontal Axis= 6  deg  P Front Axis= 46  deg  QRSD Interval= 90  ms  QT Interval= 424  ms  QRS Axis= 20  deg  T Wave Axis= 4  deg  - BORDERLINE ECG -  Sinus rhythm  Borderline T wave abnormalities  NO PRIOR TRACING AVAILABLE FOR COMPARISON  Electronically Signed By: Melita Guaman ([facility]) 05-Apr-2022 18:36:32  Date and Time of Study: 2022-04-04 11:58:15    Specimen Collected: 04/04/22 11:58          Neuro/Psych  (+) numbness (right UE from cervical issues per pt ), psychiatric history Depression,    GI/Hepatic/Renal/Endo      Musculoskeletal     (+) neck pain, neck stiffness,       ROS comment: Anterior cervical fusion   Abdominal   (+) obese,    Substance History   (+) alcohol use (daily 2-3 drinks per day), drug use (MJ 3 times per day)     OB/GYN          Other   arthritis,                      Anesthesia Plan    ASA 2     general     intravenous induction     Anesthetic plan, all risks, benefits, and alternatives have been provided, discussed and informed consent has been obtained with: patient.  Use of blood products discussed with patient  Consented to blood products.       CODE STATUS:

## 2022-04-12 NOTE — BRIEF OP NOTE
SHOULDER ARTHROSCOPY WITH ROTATOR CUFF REPAIR COMPLEX  Progress Note    Julia T Tai  4/12/2022    Pre-op Diagnosis:   Complete tear of right rotator cuff, unspecified whether traumatic [M75.121]  Subacromial impingement of right shoulder [M75.41]  Biceps tendinitis of right upper extremity [M75.21]       Post-Op Diagnosis Codes:     * Complete tear of right rotator cuff, unspecified whether traumatic [M75.121]     * Subacromial impingement of right shoulder [M75.41]     * Biceps tendinitis of right upper extremity [M75.21]    Procedure/CPT® Codes:        Procedure(s):  Right SHOULDER ARTHROSCOPY WITH ROTATOR CUFF REPAIR COMPLEX WITH BIOINDUCTIVE IMPLANT, BICEPS TENDONESIS    Surgeon(s):  Kale Wilkerson MD    Anesthesia: General with Block    Staff:   Circulator: Monique Middleton RN  Scrub Person: Keyla Del Rio; Carissa Fernandez  Assistant: Cruz Francois CSA  Assistant: Cruz Francois CSA      Estimated Blood Loss: minimal    Urine Voided: * No values recorded between 4/12/2022  3:05 PM and 4/12/2022  5:59 PM *    Specimens:                None          Drains: * No LDAs found *    Findings: see op report    Complications: none    Assistant: Cruz Fracnois CSA  was responsible for performing the following activities: Retraction, Irrigation and Closing and their skilled assistance was necessary for the success of this case.    Kale Wilkerson MD     Date: 4/12/2022  Time: 17:59 EDT

## 2022-04-12 NOTE — ANESTHESIA PROCEDURE NOTES
Peripheral Block      Patient reassessed immediately prior to procedure    Patient location during procedure: pre-op  Start time: 4/12/2022 1:23 PM  Stop time: 4/12/2022 1:26 PM  Reason for block: procedure for pain, at surgeon's request and post-op pain management  Performed by  CRNA: Elmira Mendoza CRNA  Preanesthetic Checklist  Completed: patient identified, IV checked, site marked, risks and benefits discussed, surgical consent, monitors and equipment checked, pre-op evaluation and timeout performed  Prep:  Pt Position: sitting  Sterile barriers:cap, gloves, mask and washed/disinfected hands  Prep: ChloraPrep  Patient monitoring: blood pressure monitoring, continuous pulse oximetry and EKG  Procedure    Sedation: yes  Performed under: local infiltration  Guidance:nerve stimulator and landmark technique  Images:still images obtained, printed/placed on chart    Laterality:right  Block Type:interscalene  Injection Technique:single-shot  Needle Type:echogenic  Needle Gauge:21 G  Resistance on Injection: none    Medications Used: bupivacaine PF (MARCAINE) injection 0.5%, 15 mL  Med administered at 4/12/2022 1:26 PM      Medications  Comment:precedex 20mcg added to block    Post Assessment  Injection Assessment: negative aspiration for heme, no paresthesia on injection and incremental injection  Patient Tolerance:comfortable throughout block  Complications:no  Additional Notes  Lidocaine 2% 1ml skin infiltration

## 2022-04-13 ENCOUNTER — TELEPHONE (OUTPATIENT)
Dept: ORTHOPEDIC SURGERY | Facility: CLINIC | Age: 52
End: 2022-04-13

## 2022-04-13 DIAGNOSIS — M75.121 COMPLETE TEAR OF RIGHT ROTATOR CUFF, UNSPECIFIED WHETHER TRAUMATIC: Primary | ICD-10-CM

## 2022-04-13 RX ORDER — PREGABALIN 75 MG/1
75 CAPSULE ORAL 2 TIMES DAILY
Qty: 60 CAPSULE | Refills: 0 | Status: SHIPPED | OUTPATIENT
Start: 2022-04-13 | End: 2022-07-18

## 2022-04-13 RX ORDER — MELOXICAM 15 MG/1
15 TABLET ORAL DAILY
Qty: 30 TABLET | Refills: 0 | Status: SHIPPED | OUTPATIENT
Start: 2022-04-13 | End: 2022-07-18

## 2022-04-13 NOTE — TELEPHONE ENCOUNTER
called said the pain medicine oxycodone is not working. Can she take 2 every 4-6 hours or change   GASTROINTESTINAL ENDOSCOPY N/A 2/14/2019    EGD BIOPSY performed by Mamta Juárez DO at [facility]      Past Endoscopic History: 2018 EGD   1) The esophagus was normal without evidence of esophagitis, Valentin's esophagus, strictures, rings, furrowing, masses, or nodules. 2) No significant hiatal hernia was noted. 3) There was mild amount of retained food noted in the fundus and proximal body of the stomach  4) No stomach or duodenal ulcers were noted. 5) Prior scarring in the antrum of the stomach, presumably from prior gastrostomy tube. Mild erythema of the antrum. Biopsies were obtained from the antrum and body of the stomach to rule out active gastritis and H.pylori gastritis. 6) The villous pattern appeared normal, but given symptoms, multiple small bowel biopsies were obtained to evaluate for celiac disease. 7) The ampulla appeared normal.      2019 EGD     Postoperative Diagnosis:  1) There was suspected pill induced esophagitis noted in the distal esophagus. This was from the gastroesophageal junction to 2 cm proximal to the gastroesophageal junction. This was biopsied. 2) There was no reflux esophagitis noted. There were no features of eosinophilic esophagitis noted. 3) There was a small sliding hiatal hernia noted. 4) There was no retained food noted in the stomach. Prior scarring was noted in the antrum of the stomach from prior gastrostomy tube. 5) Mild erythema of the antrum. Biopsies were obtained from the antrum and body of the stomach to rule out active gastritis and H.pylori gastritis. 6) The pylorus appeared patent. A submucosal injection of Botox was performed. 25 unit aliquots were injected in 4 quadrants, for a total of 100units injected to the pylorus. 7) There was a normal appearing duodenal bulb and second portion of the duodenum. Admission Meds  No current facility-administered medications on file prior to encounter.       Current Outpatient Medications on File Prior to Encounter   Medication Sig Dispense Refill    traZODone (DESYREL) 100 MG tablet Take 100 mg by mouth nightly      hydrOXYzine (VISTARIL) 25 MG capsule Take 1 capsule by mouth 3 times daily as needed for Anxiety 5 capsule 0    Cholecalciferol (VITAMIN D3 GUMMIES ADULT PO) Take 3,000 Units by mouth daily      omeprazole (PRILOSEC) 20 MG delayed release capsule TAKE ONE CAPSULE BY MOUTH TWICE DAILY      FLUoxetine (PROZAC) 40 MG capsule Take 80 mg by mouth nightly       ondansetron (ZOFRAN-ODT) 8 MG disintegrating tablet Take 8 mg by mouth every 8 hours as needed       metoclopramide (REGLAN) 10 MG tablet TAKE 1 TABLET BY MOUTH FOURTIMES DAILY BEFORE MEALS      Multiple Vitamins-Minerals (MULTIVITAMIN ADULT PO) Take 1 tablet by mouth daily       Ginger Root POWD Take 350 mg by mouth daily       calcium carbonate (TUMS) 500 MG chewable tablet Take 2 tablets by mouth daily as needed       ondansetron (ZOFRAN ODT) 4 MG disintegrating tablet Take 1 tablet by mouth every 8 hours as needed for Nausea 20 tablet 0    Loratadine (CLARITIN PO) Take 1 tablet by mouth as needed      levonorgestrel (MIRENA, 52 MG,) IUD 52 mg 1 each by Intrauterine route once      EPINEPHrine (EPIPEN 2-KEN) 0.3 MG/0.3ML SOAJ injection Inject 0.3 mLs into the skin once for 1 dose Use as directed for allergic reaction 1 mL 5    loperamide (IMODIUM) 2 MG capsule Take 2 mg by mouth 4 times daily as needed            Allergies  Allergies   Allergen Reactions    Abilify [Aripiprazole] Hives    Hydrocodone-Acetaminophen Itching and Nausea And Vomiting    Sulfa Antibiotics Hives and Nausea And Vomiting        Social history:  Social History     Tobacco Use    Smoking status: Current Every Day Smoker     Packs/day: 0.50     Years: 24.00     Pack years: 12.00     Types: Cigarettes     Last attempt to quit: 2018     Years since quittin.5    Smokeless tobacco: Never Used   Substance Use Topics    Alcohol use:  Yes infiltration of the liver. Suspect 3.9 cm fundal fibroid. There is an IUD. XR CHEST (2 VW)   Final Result   No radiographic evidence of acute cardiopulmonary disease. US GALLBLADDER RUQ    (Results Pending)                                              Assessment:   42-year-old female with a prior history of suspected gastroparesis with prior EGD with Botox in 2019, on p.o. Reglan at home, who presents with    1) Intractable nausea and vomiting-differentials could include an exacerbation of suspected gastroparesis versus viral gastroenteritis versus symptomatic acalculous cholecystitis versus atypical hepatitis. Lipase was minimally elevated but I suspect this is likely related to her nausea and vomiting and she had no evidence of pancreatitis on imaging    2) History of suspected gastroparesis-the patient had this diagnosis based on endoscopic he with significant amount of retained food and a GES in 2013 with 33% retention at 4 hours. She did not have a formal gastric emptying study. She has not previously done well on Reglan. She had QT prolongation was prevented the use of erythromycin or domperidone. She had previously had Botox to her pylorus with favorable results. 3) Elevated liver enzymes NOS-she did have low-grade transaminitis noted on lab work back in April 2020. Her labs in 2019 were normal. HIDA in 2018 normal.   She may have some risk factors for underlying fatty liver disease. She does drink alcohol on occasion. She does have a dilated gallbladder on CT    Recommendations:   1) Continue to monitor the trend of the patient's liver enzymes. Could consider a HIDA scan  2) EGD with botox tomorrow and to evaluate the patient's esophageal and gastric wall thickening on CT scan  3) Continue IV Reglan for now  5) pantoprazole 40 mg IV twice daily  6) Full liquid diet as tolerated      Thank you for allowing me to participate in the care of your patient.   Please feel free to contact me with any questions or concerns.      [facility], DO  [address] and [address] [facility]

## 2022-04-20 ENCOUNTER — OFFICE VISIT (OUTPATIENT)
Dept: ORTHOPEDIC SURGERY | Facility: CLINIC | Age: 52
End: 2022-04-20

## 2022-04-20 VITALS — BODY MASS INDEX: 29.23 KG/M2 | WEIGHT: 171.2 LBS | HEIGHT: 64 IN

## 2022-04-20 DIAGNOSIS — Z98.890 STATUS POST ARTHROSCOPY OF SHOULDER: Primary | ICD-10-CM

## 2022-04-20 DIAGNOSIS — M75.21 BICEPS TENDINITIS OF RIGHT UPPER EXTREMITY: ICD-10-CM

## 2022-04-20 DIAGNOSIS — M75.41 SUBACROMIAL IMPINGEMENT OF RIGHT SHOULDER: ICD-10-CM

## 2022-04-20 DIAGNOSIS — M75.121 COMPLETE TEAR OF RIGHT ROTATOR CUFF, UNSPECIFIED WHETHER TRAUMATIC: ICD-10-CM

## 2022-04-20 PROCEDURE — 99024 POSTOP FOLLOW-UP VISIT: CPT | Performed by: NURSE PRACTITIONER

## 2022-04-20 RX ORDER — OXYCODONE HYDROCHLORIDE AND ACETAMINOPHEN 5; 325 MG/1; MG/1
TABLET ORAL
Qty: 36 TABLET | Refills: 0 | Status: SHIPPED | OUTPATIENT
Start: 2022-04-20 | End: 2022-05-18 | Stop reason: SDUPTHER

## 2022-04-20 NOTE — PROGRESS NOTES
CC: F/u s/p right shoulder arthroscopy with rotator cuff repair complex with bio inductive implant, biceps tenodesis DOS 2022    Interval History: Patient returns to clinic stating pain is doing fairly well, has been using sling as instructed, denies any numbness or tingling over right arm. No fevers, chills, or sweats, and no drainage from incisions noted.    Exam:   Right shoulder- incisions clean, dry, sutures in place   Positive sensation all distributions right hand and proximal lateral aspect arm   Cap refill < 3 seconds, radial pulse 2+   Positive deltoid firing   Flex/extend fingers/thumb/wrist with 4+/5 strength, positive thumbs up, okay sign, cross finger adduction and abduction against resistance     Impression: s/p right shoulder arthroscopy with rotator cuff repair complex with bio inductive implant, biceps tenodesis     Plan:  1. D/c sutures today and replace with steri-strips- may shower, no submerging wounds x 4 weeks  2. F/u in 3 wks  3. Will start PT at 3 wk tk utilizing standard rotator cuff repair protocol with biceps tenodesis. Continue use of sling x4 weeks total. Work on finger and wrist ROM. May do gentle elbow ROM 2x/day while out of sling for showering or changing clothes.   4. All questions answered      New Medications Ordered This Visit   Medications   • oxyCODONE-acetaminophen (PERCOCET) 5-325 MG per tablet     Si-2 tablets every 4-6 hours, prn moderate-severe pain     Dispense:  36 tablet     Refill:  0       Orders Placed This Encounter   Procedures   • Ambulatory Referral to Physical Therapy POST OP     Referral Priority:   Routine     Referral Type:   Physical Therapy     Referral Reason:   Specialty Services Required     Requested Specialty:   Physical Therapy     Number of Visits Requested:   1

## 2022-04-22 RX ORDER — ASPIRIN 81 MG/1
TABLET ORAL
Qty: 14 TABLET | Refills: 0 | OUTPATIENT
Start: 2022-04-22

## 2022-04-22 NOTE — TELEPHONE ENCOUNTER
Rx Refill Note  Requested Prescriptions     Pending Prescriptions Disp Refills    Aspirin Adult Low Strength 81 MG EC tablet [Pharmacy Med Name: ASPIRIN EC 81 MG TABLET] 14 tablet 0     Sig: TAKE ONE TABLET BY MOUTH DAILY      Last office visit with prescribing clinician: 3/23/2022      Next office visit with prescribing clinician: 5/16/2022   Last Filled:04.12.2022    DX:s/p right shoulder arthroscopy with rotator cuff repair complex with bio inductive implant, biceps tenodesis DOS 04/12/2022    Do you wish this to continue?    Julia Al MA  04/22/22, 10:23 EDT    {TIP  Encounters:    {TIP  Please add Last Relevant Lab Date if appropriate:  {TIP  Is Refill Pharmacy correct?:

## 2022-04-27 NOTE — OP NOTE
Date of Operation:  4/12/2022     PREOPERATIVE DIAGNOSIS:  1. Right shoulder rotator cuff tear  2. Right shoulder biceps tendinitis  3. Right shoulder subacromial bursitis    POSTOPERATIVE DIAGNOSIS:    1. Right shoulder complex rotator cuff tear  2. Right shoulder biceps tendinopathy  3. Right shoulder subacromial bursitis    PROCEDURE PERFORMED:   1.  Right shoulder arthroscopic rotator cuff repair-complex-with bio inductive implant  2.  Right shoulder open biceps tenodesis     SURGEON: Kale Wilkerson MD     ASSISTANT:  Cruz Francois CSA-retraction, irrigation, closure, dressing application     ANESTHESIA: General endotracheal anesthesia with regional block.       ESTIMATED BLOOD LOSS:  minimal     URINE OUTPUT: Not recorded.       FLUIDS: Per anesthesia.       COMPLICATIONS: None.       SPECIMENS: None.       DRAINS: None.      IMPLANTS:  [employer] 2.8 mm Q fix anchor x1 for biceps tenodesis, 5.5 mm Healicoil anchor x2 and 5.5 mm MultiFix anchor x2 for rotator cuff repair, large [employer]ent[employer] bio inductive implant     ARTHROSCOPIC FINDINGS:   1.  Glenoid-grade 1/2 chondral wear central and superior glenoid  2.  Humeral head-grade 1/2 chondral wear anterior and superior humeral head  3.  Labrum-significant degenerative tearing of anterior and superior labrum with mild tearing of posterior labral  4.  Biceps tendon-significant intra-articular and groove biceps tendinopathy  5.  Rotator cuff-massive rotator cuff tear involving full-thickness of supraspinatus and infraspinatus with retraction to the middle third of the humeral head, crescent shaped tear  6.  Axillary pouch-free loose bodies  7.  Subacromial space-moderately thickened subacromial bursa, no significant degenerative change to AC joint     INDICATIONS FOR PROCEDURE: Patient is a pleasant 51 y.o. who has had significant limitation and use of right shoulder as well as associated pain with failure of conservative treatments. I discussed treatment options  available to the patient and patient wished to proceed with surgical treatment. I explained details of the procedure, as well as the risks, benefits, and alternatives as documented on history and physical, and the patient had all questions answered prior to signing the operative consent form. No guarantees were given in regard to results of the surgery.       DESCRIPTION OF PROCEDURE: The patient was seen, evaluated, and cleared for surgery by anesthesia. Admitted in the preoperative holding area. The operative site was marked, consent was reviewed, history and physical was updated, and preoperative labs were reviewed. A regional block was then placed per anesthesia. The patient was then taken to the operating room and placed in a supine position on a beachchair table. After successful intubation per anesthesia, facemask was placed securing head at this point time with the neck in normal anatomic position.  Patient was then elevated up into a seated position with neck maintained in normal anatomic alignment. All bony prominences were well-padded and patient was secured to the table with a waist strap. The right  upper extremity was then sterilely prepped and draped in a standard fashion.       A formal timeout was completed, including confirmation of History and Physical, operative consent, surgical site, patient identification number, and preoperative antibiotic administration.       Attention was then turned to creation of posterior portal with a 11 blade followed by insertion of blunt trocar and cannula.  Scope was inserted at this point time.  Anterior portal was then made in the rotator interval with spinal needle using outside in technique.  Cannula was then inserted over a trocar and diagnostic arthroscopic exam was carried out at this point in time with findings as noted above.     Attention was then turned to debridement of glenohumeral joint space including debridement of the superior, anterior, posterior  labral fraying with combination of hand-held shaver and ablation wand.       Attention was then turned to release of the biceps tendon. Arthroscopic scissor was inserted through the anterior portal and used to release the biceps tendon at the superior labrum. The superior labrum was then debrided to a smooth stable edge with no residual prominence noted with use of a hand-held shaver as well as ablation wand at this time.     Attention was then turned to open biceps tenodesis.  4 cm longitudinal incision was made centered over the inferior border of the pectoralis major through skin only with 15 blade at this point time.  Once subcutaneous dissection was carried out to the inferior border the pectoralis major was bluntly elevated proximal lateral and retracted at this time.  Biceps tendon was identified at this point time and retrieved with the right angle.  Q fix guide was placed in the bicipital groove of the proximalmost portion identified under direct visualization.  Drill was passed in unicortical fashion followed by insertion of Q fix anchor which was secured into position with good stability on tension across the suture strands.  Biceps tendon was then whipstitched with a running locking stitch, using one suture strand from each pair.  The second strand was passed in simple fashion to the biceps tendon.  The musculotendinous junction was reapproximated to the inferior border of the pectoralis major.  Excess biceps tendon was resected and tenodesis was then completed with the tendon being tied down with 6 alternating half hitches ensuring good loop and knot security of the repair site.  Sutures were cut short at this point time.  Wound was then thoroughly irrigated with normal saline.      Attention was then returned to the subacromial space where the scope was inserted through the posterior portal, cannula inserted through the anterior portal and subacromial space was evaluated at this point in time.   Debridement of subacromial bursitis was completed with shaver as well as ablation wand at this point time.    Attention was then turned to repair of the massive rotator cuff tear with initial debridement of adhesions and subacromial bursitis from the superior aspect of the rotator cuff tissue as well as debridement of underlying adhesions from the inferior layer of the rotator cuff with some delamination of the tear noted at this time.  Grasper was used to reduce the rotator cuff to its appropriate attachment site and it was noted to be primarily a crescent shaped tear at this time.  Debridement of the greater tuberosity with shaver was completed to create a bleeding bony bed and allow for enhancement of biologic healing potential at this time.    Attention was then turned to placement of 5.5 mm Healicoil anchor x2, 1 anterior and 1 posterior at the articular margin at this time.  Traction was pulled across sutures confirming appropriate fixation and stability of anchors at this time.  Sutures were then passed in horizontal mattress fashion with use of Acu pass as well as first pass device, ensuring full-thickness passage of suture through both the superior and inferior delaminated layers at this time.  1 strand from each suture pair was then brought to an anterior lateral row anchor and in likewise fashion 1 strand from each pair brought to a posterior lateral row anchor.  Sutures were passed through the eyelet of each lateral row anchor which was then impacted into position with appropriate tension applied to the suture strands at this point.  Appropriate compression rotator cuff tissue onto the bony attachment site was noted at this time and sutures were then cut short.  This completed double row repair of rotator cuff tear.     Given significant tendinopathy of the rotator cuff tissue, we elected proceed with placement of bio inductive implant. Regenten large sized bio inductive implant was then inserted with  insertion device from the lateral portal and laid over top of the residual defect site of the rotator cuff tear.  Soft tissue staples were placed anterior, central, and posterior along the medial margin as well as along the anterior central margin to secure the graft.  Insertion device was removed at this point time and bone staples were placed x 2 laterally.  Additional soft tissue staples were used to secure the anterior and posterior margins of the graft to the underlying cuff tissue.  Graft was noted to be stable on arthroscopic exam with motion and acceptable position of the graft.    Fluid was evacuated with suction and arthroscopic instruments were removed at this point time.     Attention was then turned to closure the wounds with biceps tenodesis site being closed with 3-0 Vicryl for subcutaneous closure in inverted fashion followed by skin closure with 3-0 Monocryl and Steri-Strips.  Portal sites were closed with 3-0 nylon in interrupted fashion.  Wounds were dressed with Xeroform gauze, 4 x 4, Tegaderm, ABD pad, Medipore tape and patient was placed in a sling with abduction pillow to the left side.     At the end of the procedure, all lap, needle, and sponge counts were correct x2. The patient had brisk capillary refill to all digits of the left upper extremity. Compartments were soft and easily compressible at the end of the procedure.       DISPOSITION: The patient was extubated per anesthesia and taken to the recovery room in stable condition. Will follow up in office in 1 week for wound check. Results discussed immediately after procedure with family and all questions were answered at that time.       REHAB:  Will place patient on massive rotator cuff repair protocol, sling x6 weeks, begin physical therapy at week 4.  Biceps tenodesis precautions.

## 2022-05-03 ENCOUNTER — HOSPITAL ENCOUNTER (OUTPATIENT)
Dept: PHYSICAL THERAPY | Facility: HOSPITAL | Age: 52
Setting detail: THERAPIES SERIES
Discharge: HOME | End: 2022-05-03

## 2022-05-03 DIAGNOSIS — Z98.890 S/P ROTATOR CUFF SURGERY: Primary | ICD-10-CM

## 2022-05-03 PROCEDURE — 97161 PT EVAL LOW COMPLEX 20 MIN: CPT | Performed by: PHYSICAL THERAPIST

## 2022-05-03 NOTE — THERAPY EVALUATION
Outpatient Physical Therapy Ortho Initial Evaluation  [facility]     Patient Name: Julia Lujan  : 1970  MRN: [id number]  Today's Date: 5/3/2022      Visit Date: 2022    Patient Active Problem List   Diagnosis   • Essential (primary) hypertension   • Major depressive disorder, single episode, unspecified   • Screen for colon cancer   • Complex tear of medial meniscus of left knee as current injury   • Complete tear of right rotator cuff   • Subacromial impingement of right shoulder   • Biceps tendinitis of right upper extremity   • s/p right shoulder arthroscopy with rotator cuff repair complex with bio inductive implant, biceps tenodesis DOS 2022        Past Medical History:   Diagnosis Date   • Arthritis     neck and ble   • Cervicalgia    • Elevated blood pressure reading without diagnosis of hypertension    • Essential (primary) hypertension    • H/O mammogram 2019   • Headache    • Hematuria, unspecified    • Major depressive disorder, single episode, unspecified    • Menopausal and female climacteric states    • Nevus, non-neoplastic    • Pain in thoracic spine    • Papanicolaou smear    • Pneumonia, unspecified organism    • Radiculopathy, cervical region    • Rib pain    • Unspecified injury of muscle(s) and tendon(s) of the rotator cuff of right shoulder, initial encounter         Past Surgical History:   Procedure Laterality Date   • BREAST IMPLANT SURGERY Bilateral    • COLONOSCOPY W/ POLYPECTOMY N/A 2022    Procedure: COLONOSCOPY WITH POLYPECTOMY;  Surgeon: Clinton Hernandez MD;  Location: [facility];  Service: Gastroenterology;  Laterality: N/A;  sigmoid polyps x 2     • HYSTERECTOMY     • LIPOSUCTION      bilateral thighs   • NECK SURGERY      Les chao   • SHOULDER ARTHROSCOPY W/ ROTATOR CUFF REPAIR Right 2022    Procedure: SHOULDER ARTHROSCOPY WITH ROTATOR CUFF REPAIR COMPLEX WITH BIOINDUCTIVE IMPLANT, BICEPS TENDONESIS;   Surgeon: Kale Wilkerson MD;  Location: [facility];  Service: Orthopedics;  Laterality: Right;       Visit Dx:     ICD-10-CM ICD-9-CM   1. S/P rotator cuff surgery  Z98.890 V45.89          Patient History     Row Name 05/03/22 1115 05/02/22 1741          History    Chief Complaint Burn;Difficulty with daily activities;Muscle weakness;Pain  -GC Burn;Difficulty with daily activities;Muscle weakness;Pain  -GC (r) patient (t)     Type of Pain Shoulder pain  right  -GC Shoulder pain  -GC (r) patient (t)     Date Current Problem(s) Began 01/02/18  -GC 01/02/18  -GC (r) patient (t)     Brief Description of Current Complaint Pt reports an approximate 3 year history of right shoulder pain that made it increasingly difficult with her job as a . she was seen by Dr. Wilkerson who did an MRI and found a rotator cuff tear with bicep invphilip,ment as well. She underwent arthroscopic RCR  with bioinductive patch with biceps tenodysis on 4/12. She has been in a sling with ABD pillow since. She has been performing pendulum exercises per MD instruction. She is now referred fo r therapy using the massive rotator cuff tear protocol.  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 No  -GC No  -GC (r) patient (t)     What clinical tests have you had for this problem? MRI;Nerve Conduction Test  -GC MRI;Nerve Conduction Test  -GC (r) patient (t)     Are you or can you be  pregnant No  -GC No  -GC (r) patient (t)            Pain     Pain Location Shoulder  right  -GC --     Pain at Present 3  -GC --     Pain at Best 0  -GC --     Pain at Worst 4  -GC --     Pain Frequency Intermittent  -GC --     Pain Description Burning;Tender;Sore;Discomfort  -GC --     What Performance Factors Make the Current Problem(s) WORSE? Pt c/o pain if she moves her right arm with ADL function  -GC --     What Performance Factors Make the Current Problem(s) BETTER? Pt has no real pain at rest  -GC --     Difficulties at work? 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 Pt has some difficulty with ADL functuion, but she is performing dressing, bathing, grooming independently  -GC --            Fall Risk Assessment    Any falls in the past year: Yes  -GC Yes  -GC (r) patient (t)     Factors that contributed to the fall: Lost balance  -GC Lost balance  -GC (r) patient (t)            Services    Prior Rehab/Home Health Experiences No  -GC No  -GC (r) patient (t)     Are you currently receiving Home Health services No  -GC No  -GC (r) patient (t)     Do you plan to receive Home Health services in the near future No  -GC No  -GC (r) patient (t)            Daily Activities    Primary Language English  -GC English  -GC (r) patient (t)     Are you able to read Yes  -GC Yes  -GC (r) patient (t)     Are you able to write Yes  -GC Yes  -GC (r) patient (t)     How does patient learn best? Demonstration  -GC Demonstration  -GC (r) patient (t)     Teaching needs identified Home Exercise Program;Management of Condition  -GC --     Patient is concerned about/has problems with Performing home management (household chores, shopping, care of dependents);Performing job responsibilities/community activities (work, school,;Reaching over head;Repetitive movements of the hand, arm, shoulder;Grasping objects lifting  -GC --     Does patient have problems with the following? None  -GC --      Barriers to learning None  -GC --     Functional Status mobility issues preventing performance of daily activities  -GC --     Pt Participated in POC and Goals Yes  -GC --            Safety    Are you being hurt, hit, or frightened by anyone at home or in your life? No  -GC No  -GC (r) patient (t)     Are you being neglected by a caregiver No  -GC No  -GC (r) patient (t)     Have you had any of the following issues with Depression;Anxiety  -GC Depression;Anxiety  -GC (r) patient (t)           User Key  (r) = Recorded By, (t) = Taken By, (c) = Cosigned By    Initials Name Provider Type    GC Brett Iyer, PT Physical Therapist    patient Julia Lujan --                 PT Ortho     Row Name 05/03/22 1115       Posture/Observations    Posture/Observations Comments Pt initially seen with right UE in sling with ABD pillow. The surgical sites are healing nicely covered with steri-strips. She has mild atrophy in deltoid, biceps, and periscapular musculature.  -GC       Right Upper Ext    Rt Shoulder Abduction PROM 94 degrees  -GC    Rt Shoulder Flexion PROM 105 degrees  -GC    Rt Shoulder External Rotation PROM 40  -GC    Rt Shoulder Internal Rotation PROM 45  -GC       MMT (Manual Muscle Testing)    General MMT Comments No MMT completed due to post-op protocol  -GC       Sensation    Light Touch No apparent deficits  -GC          User Key  (r) = Recorded By, (t) = Taken By, (c) = Cosigned By    Initials Name Provider Type    GC Brett Iyer, PT Physical Therapist                            Therapy Education  Given: Symptoms/condition management, HEP, Pain management  Program: Reinforced  How Provided: Verbal, Demonstration  Provided to: Patient  Level of Understanding: Teach back education performed, Verbalized, Demonstrated      PT OP Goals     Row Name 05/03/22 1115          PT Short Term Goals    STG Date to Achieve 05/31/22  -GC     STG 1 Decrease right shoulder pain to 1-2/10 with activity.  -GC     STG 2  Increase PROM of right shoulder to 145 degrees FLEX and ABD and 75 degrees ER and IR with testing.  -     STG 3 Pt will be independent with her HEP issued by this therapist.  -            Long Term Goals    LTG Date to Achieve 06/28/22  -     LTG 1 Decrease right shoulder pain to 0-1/10 with activity.  -GC     LTG 2 Increase AROM of right shoulder to 175 degrees FLEX and ABD and 85 degrees ER and IR with testing.  -GC     LTG 3 Increase right shoulder girdle strength to at least 4+/5 all planes with testing.  -     LTG 4 Pt will be independent with all ADLs and have a Quick Dash score < 12.  -            Time Calculation    PT Goal Re-Cert Due Date 05/31/22  -           User Key  (r) = Recorded By, (t) = Taken By, (c) = Cosigned By    Initials Name Provider Type     Brett Iyer, PT Physical Therapist                 PT Assessment/Plan     Row Name 05/03/22 6884          PT Assessment    Functional Limitations Limitation in home management;Limitations in community activities;Limitations in functional capacity and performance;Performance in leisure activities;Performance in self-care ADL;Performance in work activities  -     Impairments Range of motion;Pain;Muscle strength  -     Assessment Comments Pt presents approximately 3 weeks s/p arthroscopic right RCR with bioinductive patch. She is doing very well with pain rated only as a 4/10. she has the expected decreased in shoulder ROM and it is assumed a decrease in strength although no MMT is able to be completed due to post-op protocol. Her daily functin is still good as she reports she is independent with dressing, bathing, and grooming. 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  -     Rehab Potential Good  -     Patient/caregiver participated in establishment of treatment plan and goals Yes  -     Patient would benefit from skilled therapy intervention Yes  -            PT Plan    PT Frequency 1x/week;2x/week  -      Predicted Duration of Therapy Intervention (PT) 8 weeks  -     Planned CPT's? PT EVAL LOW COMPLEXITY: 37886;PT THER PROC EA 15 MIN: 25059;PT MANUAL THERAPY EA 15 MIN: 77375;PT HOT OR COLD PACK TREAT MCARE;PT ELECTRICAL STIM UNATTEND:   -     PT Plan Comments Pt will continue with pendulum exercises 2-3x daily.  -           User Key  (r) = Recorded By, (t) = Taken By, (c) = Cosigned By    Initials Name Provider Type     Brett Iyer, PT Physical Therapist                 Modalities     Row Name 05/03/22 1115             Moist Heat    MH Applied Yes  -      Location right shoulder with pt supine and pillow under right elbow and forearm  -      PT Moist Heat Minutes 10  -GC      MH Prior to Rx Yes  -            User Key  (r) = Recorded By, (t) = Taken By, (c) = Cosigned By    Initials Name Provider Type     Brett Iyer, PT Physical Therapist                Manual Rx (last 36 hours)     Manual Treatments     Row Name 05/03/22 1115             Manual Rx 1    Manual Rx 1 Location right shoulder  -      Manual Rx 1 Type PROM in FLEX-ABD-ER-IR per massive rotator cuff protocol  -      Manual Rx 1 Duration 15 min  -            User Key  (r) = Recorded By, (t) = Taken By, (c) = Cosigned By    Initials Name Provider Type     Brett Iyer, PT Physical Therapist                            Outcome Measure Options: Quick DASH  Quick DASH  Open a tight or new jar.: Moderate Difficulty  Do heavy household chores (e.g., wash walls, wash floors): Mild Difficulty  Carry a shopping bag or briefcase: Mild Difficulty  Wash your back: Mild Difficulty  Use a knife to cut food: Mild Difficulty  Recreational activities in which you take some force or impact through your arm, should or hand (e.g. golf, hammering, tennis, etc.): Mild Difficulty  During the past week, to what extent has your arm, shoulder, or hand problem interfered with your normal social activites with family, friends, neighbors or groups?:  Moderately  During the past week, were you limited in your work or other regular daily activities as a result of your arm, shoulder or hand problem?: Moderately Limited  Arm, Shoulder, or hand pain: Moderate  Tingling (pins and needles) in your arm, shoulder, or hand: Mild  During the past week, how much difficulty have you had sleeping because of the pain in your arm, shoulder or hand?: Moderate Difficiculty  Number of Questions Answered: 11  Quick DASH Score: 36.36         Time Calculation:     Start Time: 1115  Stop Time: 1205  Time Calculation (min): 50 min  Untimed Charges  PT Moist Heat Minutes: 10  Total Minutes  Untimed Charges Total Minutes: 10   Total Minutes: 10     Therapy Charges for Today     Code Description Service Date Service Provider Modifiers Qty    [id number] HC PT EVAL LOW COMPLEXITY 3 5/3/2022 Brett Iyer, PT GP 1          PT G-Codes  Outcome Measure Options: Quick DASH  Quick DASH Score: 36.36         Brett Iyer, PT  5/3/2022

## 2022-05-07 RX ORDER — VENLAFAXINE HYDROCHLORIDE 150 MG/1
150 CAPSULE, EXTENDED RELEASE ORAL DAILY
Qty: 90 CAPSULE | Refills: 0 | Status: SHIPPED | OUTPATIENT
Start: 2022-05-07

## 2022-05-10 ENCOUNTER — HOSPITAL ENCOUNTER (OUTPATIENT)
Dept: PHYSICAL THERAPY | Facility: HOSPITAL | Age: 52
Setting detail: THERAPIES SERIES
Discharge: HOME | End: 2022-05-10

## 2022-05-10 DIAGNOSIS — Z98.890 S/P ROTATOR CUFF SURGERY: Primary | ICD-10-CM

## 2022-05-10 PROCEDURE — 97140 MANUAL THERAPY 1/> REGIONS: CPT | Performed by: PHYSICAL THERAPIST

## 2022-05-10 NOTE — THERAPY TREATMENT NOTE
Outpatient Physical Therapy Ortho Treatment Note  ALEC Staton     Patient Name: Julia Lujan  : 1970  MRN: [id number]  Today's Date: 5/10/2022      Visit Date: 05/10/2022    Visit Dx:    ICD-10-CM ICD-9-CM   1. S/P rotator cuff surgery  Z98.890 V45.89       Patient Active Problem List   Diagnosis   • Essential (primary) hypertension   • Major depressive disorder, single episode, unspecified   • Screen for colon cancer   • Complex tear of medial meniscus of left knee as current injury   • Complete tear of right rotator cuff   • Subacromial impingement of right shoulder   • Biceps tendinitis of right upper extremity   • s/p right shoulder arthroscopy with rotator cuff repair complex with bio inductive implant, biceps tenodesis DOS 2022        Past Medical History:   Diagnosis Date   • Arthritis     neck and ble   • Cervicalgia    • Elevated blood pressure reading without diagnosis of hypertension    • Essential (primary) hypertension    • H/O mammogram 2019   • Headache    • Hematuria, unspecified    • Major depressive disorder, single episode, unspecified    • Menopausal and female climacteric states    • Nevus, non-neoplastic    • Pain in thoracic spine    • Papanicolaou smear    • Pneumonia, unspecified organism    • Radiculopathy, cervical region    • Rib pain    • Unspecified injury of muscle(s) and tendon(s) of the rotator cuff of right shoulder, initial encounter         Past Surgical History:   Procedure Laterality Date   • BREAST IMPLANT SURGERY Bilateral    • COLONOSCOPY W/ POLYPECTOMY N/A 2022    Procedure: COLONOSCOPY WITH POLYPECTOMY;  Surgeon: Clinton Hernandez MD;  Location: [facility];  Service: Gastroenterology;  Laterality: N/A;  sigmoid polyps x 2     • HYSTERECTOMY     • LIPOSUCTION      bilateral thighs   • NECK SURGERY      Les chao   • SHOULDER ARTHROSCOPY W/ ROTATOR CUFF REPAIR Right 2022    Procedure: SHOULDER ARTHROSCOPY WITH  ROTATOR CUFF REPAIR COMPLEX WITH BIOINDUCTIVE IMPLANT, BICEPS TENDONESIS;  Surgeon: Kale Wilkerson MD;  Location: [facility];  Service: Orthopedics;  Laterality: Right;        PT Ortho     Row Name 05/10/22 1100       Subjective Comments    Subjective Comments Patient reports she had some soreness in right shoulder following last visit.  She reports that she is having difficulty sleeping, but otherwise feels she is doing well.  -SP          User Key  (r) = Recorded By, (t) = Taken By, (c) = Cosigned By    Initials Name Provider Type    Delia Nazario, PT Physical Therapist                             PT Assessment/Plan     Row Name 05/10/22 1537          PT Assessment    Assessment Comments Patient tolerates PROM to right shoulder well with minimal complaints of pain or muscle guarding.  -SP            PT Plan    PT Plan Comments Continue to progress per protocol.  -SP           User Key  (r) = Recorded By, (t) = Taken By, (c) = Cosigned By    Initials Name Provider Type    Delia Nazario, PT Physical Therapist                 Modalities     Row Name 05/10/22 1100             Moist Heat    MH Applied Yes  -SP      Location right shoulder with pt supine and pillow under right elbow and forearm  -SP      PT Moist Heat Minutes 10  -SP      MH Prior to Rx Yes  -SP              Ice    Ice Applied Yes  -SP      Location right shoulder with patient supine, pillow under elbow  -SP      PT Ice Rx Minutes 10  -SP      Ice S/P Rx Yes  -SP              Functional Testing    Outcome Measure Options Quick DASH  -SP            User Key  (r) = Recorded By, (t) = Taken By, (c) = Cosigned By    Initials Name Provider Type    Delia Nazario, PT Physical Therapist               OP Exercises     Row Name 05/10/22 1542 05/10/22 1100          Subjective Comments    Subjective Comments -- Patient reports she had some soreness in right shoulder following last visit.  She reports that she is having  difficulty sleeping, but otherwise feels she is doing well.  -SP            Total Minutes    42836 - PT Manual Therapy Minutes 20  -SP --           User Key  (r) = Recorded By, (t) = Taken By, (c) = Cosigned By    Initials Name Provider Type    Delia Nazario, PT Physical Therapist                         Manual Rx (last 36 hours)     Manual Treatments     Row Name 05/10/22 1542 05/10/22 1100          Total Minutes    20583 - PT Manual Therapy Minutes 20  -SP --            Manual Rx 1    Manual Rx 1 Location -- right shoulder  -SP     Manual Rx 1 Type -- PROM in FLEX-ABD-ER-IR per massive rotator cuff protocol  -SP     Manual Rx 1 Duration -- 20 min  -SP           User Key  (r) = Recorded By, (t) = Taken By, (c) = Cosigned By    Initials Name Provider Type    Delia Nazario, PT Physical Therapist                    Therapy Education  Education Details: Reviewed codmans/pendulum exercise and AROM right hand/wrist and forearm  Given: HEP  Program: Reinforced  How Provided: Verbal  Provided to: Patient  Level of Understanding: Verbalized, Demonstrated    Outcome Measure Options: Quick DASH         Time Calculation:   Start Time: 1100  Stop Time: 1155  Time Calculation (min): 55 min  Timed Charges  90607 - PT Manual Therapy Minutes: 20  Untimed Charges  PT Moist Heat Minutes: 10  PT Ice Rx Minutes: 10  Total Minutes  Timed Charges Total Minutes: 20  Untimed Charges Total Minutes: 20   Total Minutes: 40  Therapy Charges for Today     Code Description Service Date Service Provider Modifiers Qty    89166263213 HC PT MANUAL THERAPY EA 15 MIN 5/10/2022 Delia Vieira, PT GP 1          PT G-Codes  Outcome Measure Options: Quick DASH         Delia Vieira PT  5/10/2022

## 2022-05-16 ENCOUNTER — OFFICE VISIT (OUTPATIENT)
Dept: ORTHOPEDIC SURGERY | Facility: CLINIC | Age: 52
End: 2022-05-16

## 2022-05-16 VITALS — HEIGHT: 64 IN | BODY MASS INDEX: 29.24 KG/M2 | WEIGHT: 171.3 LBS

## 2022-05-16 DIAGNOSIS — Z98.890 STATUS POST ARTHROSCOPY OF SHOULDER: Primary | ICD-10-CM

## 2022-05-16 PROCEDURE — 99024 POSTOP FOLLOW-UP VISIT: CPT | Performed by: ORTHOPAEDIC SURGERY

## 2022-05-16 NOTE — PROGRESS NOTES
CC: F/u s/p right shoulder rotator cuff repair and biceps tenodesis  DOS 4/12/2022    Interval History: Patient returns to clinic stating pain is doing fairly well, has been using sling as instructed, denies any numbness or tingling over right arm. No fevers, chills, or sweats, and no drainage from incisions noted. She has started into physical therapy.    Exam:   Right shoulder- incisions healing well   Tolerates FF- 120,   ER- 20   Positive sensation all distributions right hand and proximal lateral aspect arm, positive deltoid firing   Cap refill < 3 seconds, radial pulse 2+   Positive deltoid firing   Flex/extend fingers/thumb/wrist with 4+/5 strength, positive thumbs up, okay sign, cross finger adduction and abduction against resistance    REHAB:  Will place patient on massive rotator cuff repair protocol, sling x6 weeks, begin physical therapy at week 4.  Biceps tenodesis precautions.     Impression: s/p right shoulder rotator cuff repair and biceps tenodesis     Plan:  1. Continue PT for work on ROM and progressing into strengthening per protocol  2. Discontinue sling over next week  3. Instructed on passive ROM exercises to be done multiple times daily at home  4. F/u in 4 weeks to evaluate motion and progress with PT  5. All questions answered      No orders of the defined types were placed in this encounter.      No orders of the defined types were placed in this encounter.

## 2022-05-18 ENCOUNTER — HOSPITAL ENCOUNTER (OUTPATIENT)
Dept: PHYSICAL THERAPY | Facility: HOSPITAL | Age: 52
Setting detail: THERAPIES SERIES
Discharge: HOME | End: 2022-05-18

## 2022-05-18 DIAGNOSIS — M75.21 BICEPS TENDINITIS OF RIGHT UPPER EXTREMITY: ICD-10-CM

## 2022-05-18 DIAGNOSIS — M75.121 COMPLETE TEAR OF RIGHT ROTATOR CUFF, UNSPECIFIED WHETHER TRAUMATIC: ICD-10-CM

## 2022-05-18 DIAGNOSIS — Z98.890 S/P ROTATOR CUFF SURGERY: Primary | ICD-10-CM

## 2022-05-18 DIAGNOSIS — M75.41 SUBACROMIAL IMPINGEMENT OF RIGHT SHOULDER: ICD-10-CM

## 2022-05-18 PROCEDURE — 97110 THERAPEUTIC EXERCISES: CPT | Performed by: PHYSICAL THERAPIST

## 2022-05-18 RX ORDER — OXYCODONE HYDROCHLORIDE AND ACETAMINOPHEN 5; 325 MG/1; MG/1
TABLET ORAL
Qty: 36 TABLET | Refills: 0 | Status: SHIPPED | OUTPATIENT
Start: 2022-05-18 | End: 2022-06-01 | Stop reason: SDUPTHER

## 2022-05-18 NOTE — THERAPY TREATMENT NOTE
Outpatient Physical Therapy Ortho Treatment Note  ALEC Staton     Patient Name: Julia Lujan  : 1970  MRN: [id number]  Today's Date: 2022      Visit Date: 2022    Visit Dx:    ICD-10-CM ICD-9-CM   1. S/P rotator cuff surgery  Z98.890 V45.89       Patient Active Problem List   Diagnosis   • Essential (primary) hypertension   • Major depressive disorder, single episode, unspecified   • Screen for colon cancer   • Complex tear of medial meniscus of left knee as current injury   • Complete tear of right rotator cuff   • Subacromial impingement of right shoulder   • Biceps tendinitis of right upper extremity   • s/p right shoulder arthroscopy with rotator cuff repair complex with bio inductive implant, biceps tenodesis DOS 2022        Past Medical History:   Diagnosis Date   • Arthritis     neck and ble   • Cervicalgia    • Elevated blood pressure reading without diagnosis of hypertension    • Essential (primary) hypertension    • H/O mammogram 2019   • Headache    • Hematuria, unspecified    • Major depressive disorder, single episode, unspecified    • Menopausal and female climacteric states    • Nevus, non-neoplastic    • Pain in thoracic spine    • Papanicolaou smear    • Pneumonia, unspecified organism    • Radiculopathy, cervical region    • Rib pain    • Unspecified injury of muscle(s) and tendon(s) of the rotator cuff of right shoulder, initial encounter         Past Surgical History:   Procedure Laterality Date   • BREAST IMPLANT SURGERY Bilateral    • COLONOSCOPY W/ POLYPECTOMY N/A 2022    Procedure: COLONOSCOPY WITH POLYPECTOMY;  Surgeon: Clinton Hernandez MD;  Location: [facility];  Service: Gastroenterology;  Laterality: N/A;  sigmoid polyps x 2     • HYSTERECTOMY     • LIPOSUCTION      bilateral thighs   • NECK SURGERY      Les chao   • SHOULDER ARTHROSCOPY W/ ROTATOR CUFF REPAIR Right 2022    Procedure: SHOULDER ARTHROSCOPY WITH  ROTATOR CUFF REPAIR COMPLEX WITH BIOINDUCTIVE IMPLANT, BICEPS TENDONESIS;  Surgeon: Kale Wilkerson MD;  Location: [facility];  Service: Orthopedics;  Laterality: Right;        PT Ortho     Row Name 05/18/22 1100       Posture/Observations    Posture/Observations Comments Pt no longer has ABD pillow with sling  -GC          User Key  (r) = Recorded By, (t) = Taken By, (c) = Cosigned By    Initials Name Provider Type    GC Brett Iyer, PT Physical Therapist                             PT Assessment/Plan     Row Name 05/18/22 1100          PT Assessment    Assessment Comments Pt is doing well with increased shoulder ROM and good tolerance to AAROM exercises.  -GC            PT Plan    PT Plan Comments Pt is to continue her HEP of stretches 2x daily.  -GC           User Key  (r) = Recorded By, (t) = Taken By, (c) = Cosigned By    Initials Name Provider Type    Brett Vogel, PT Physical Therapist                 Modalities     Row Name 05/18/22 1100             Subjective Comments    Subjective Comments Pt states her shoulder is feeling pretty good.  -GC              Moist Heat    MH Applied Yes  -GC      Location right shoulder with pt supine and pillow under right elbow and forearm  -GC      PT Moist Heat Minutes 10  -GC      MH Prior to Rx Yes  -GC            User Key  (r) = Recorded By, (t) = Taken By, (c) = Cosigned By    Initials Name Provider Type    Brett Vogel, PT Physical Therapist               OP Exercises     Row Name 05/18/22 1100             Subjective Comments    Subjective Comments Pt states her shoulder is feeling pretty good.  -GC              Exercise 1    Exercise Name 1 Cane stretch-FLEX  -GC      Cueing 1 Verbal;Demo  -GC      Reps 1 15  -GC      Time 1 5 secs  -GC              Exercise 2    Exercise Name 2 Cane stretch-ABD  -GC      Cueing 2 Verbal;Demo  -GC      Reps 2 15  -GC      Time 2 5 secs  -GC              Exercise 3    Exercise Name 3 Cane stretch-EX  -GC      Cueing 3  Verbal;Demo  -GC      Reps 3 15  -GC      Time 3 5 secs  -            User Key  (r) = Recorded By, (t) = Taken By, (c) = Cosigned By    Initials Name Provider Type     Brett Iyer, PT Physical Therapist                         Manual Rx (last 36 hours)     Manual Treatments     Row Name 05/18/22 1100             Manual Rx 1    Manual Rx 1 Location right shoulder  -      Manual Rx 1 Type PROM in FLEX-ABD-ER-IR per massive rotator cuff protocol  -      Manual Rx 1 Duration 20 min  -            User Key  (r) = Recorded By, (t) = Taken By, (c) = Cosigned By    Initials Name Provider Type     Brett Iyer, PT Physical Therapist                                   Time Calculation:   Start Time: 1100  Stop Time: 1156  Time Calculation (min): 56 min  Untimed Charges  PT Moist Heat Minutes: 10  Total Minutes  Untimed Charges Total Minutes: 10   Total Minutes: 10  Therapy Charges for Today     Code Description Service Date Service Provider Modifiers Qty    87474362278 HC PT THER PROC EA 15 MIN 5/18/2022 Brett Iyer, PT GP 1                    Brett Iyer PT  5/18/2022

## 2022-05-18 NOTE — TELEPHONE ENCOUNTER
Rx Refill Note  Requested Prescriptions     Pending Prescriptions Disp Refills    oxyCODONE-acetaminophen (PERCOCET) 5-325 MG per tablet 36 tablet 0     Si-2 tablets every 4-6 hours, prn moderate-severe pain      Last office visit with prescribing clinician: 2022      Next office visit with prescribing clinician: 22  Last Filled:22      Encounter Diagnoses   Name Primary?    Subacromial impingement of right shoulder     Biceps tendinitis of right upper extremity     Complete tear of right rotator cuff, unspecified whether traumatic       Date of Surgery:22      Trang Stack MA  22, 12:03 EDT    {TIP  Encounters:    {TIP  Please add Last Relevant Lab Date if appropriate:  {TIP  Is Refill Pharmacy correct?:

## 2022-05-26 ENCOUNTER — HOSPITAL ENCOUNTER (OUTPATIENT)
Dept: PHYSICAL THERAPY | Facility: HOSPITAL | Age: 52
Setting detail: THERAPIES SERIES
Discharge: HOME | End: 2022-05-26

## 2022-05-26 DIAGNOSIS — Z98.890 S/P ROTATOR CUFF SURGERY: Primary | ICD-10-CM

## 2022-05-26 PROCEDURE — 97110 THERAPEUTIC EXERCISES: CPT | Performed by: PHYSICAL THERAPIST

## 2022-05-26 PROCEDURE — 97140 MANUAL THERAPY 1/> REGIONS: CPT | Performed by: PHYSICAL THERAPIST

## 2022-05-26 NOTE — THERAPY TREATMENT NOTE
Outpatient Physical Therapy Ortho Treatment Note  ALEC Staton     Patient Name: Julia Lujan  : 1970  MRN: [id number]  Today's Date: 2022      Visit Date: 2022    Visit Dx:    ICD-10-CM ICD-9-CM   1. S/P rotator cuff surgery  Z98.890 V45.89       Patient Active Problem List   Diagnosis   • Essential (primary) hypertension   • Major depressive disorder, single episode, unspecified   • Screen for colon cancer   • Complex tear of medial meniscus of left knee as current injury   • Complete tear of right rotator cuff   • Subacromial impingement of right shoulder   • Biceps tendinitis of right upper extremity   • s/p right shoulder arthroscopy with rotator cuff repair complex with bio inductive implant, biceps tenodesis DOS 2022        Past Medical History:   Diagnosis Date   • Arthritis     neck and ble   • Cervicalgia    • Elevated blood pressure reading without diagnosis of hypertension    • Essential (primary) hypertension    • H/O mammogram 2019   • Headache    • Hematuria, unspecified    • Major depressive disorder, single episode, unspecified    • Menopausal and female climacteric states    • Nevus, non-neoplastic    • Pain in thoracic spine    • Papanicolaou smear    • Pneumonia, unspecified organism    • Radiculopathy, cervical region    • Rib pain    • Unspecified injury of muscle(s) and tendon(s) of the rotator cuff of right shoulder, initial encounter         Past Surgical History:   Procedure Laterality Date   • BREAST IMPLANT SURGERY Bilateral    • COLONOSCOPY W/ POLYPECTOMY N/A 2022    Procedure: COLONOSCOPY WITH POLYPECTOMY;  Surgeon: Clinton Hernandez MD;  Location: [facility];  Service: Gastroenterology;  Laterality: N/A;  sigmoid polyps x 2     • HYSTERECTOMY     • LIPOSUCTION      bilateral thighs   • NECK SURGERY      Les chao   • SHOULDER ARTHROSCOPY W/ ROTATOR CUFF REPAIR Right 2022    Procedure: SHOULDER ARTHROSCOPY WITH  ROTATOR CUFF REPAIR COMPLEX WITH BIOINDUCTIVE IMPLANT, BICEPS TENDONESIS;  Surgeon: Kale Wilkerson MD;  Location: [facility];  Service: Orthopedics;  Laterality: Right;                        PT Assessment/Plan     Row Name 05/26/22 1040          PT Assessment    Assessment Comments Pt is showing increased range with her stretches.  -GC            PT Plan    PT Plan Comments Pt is to continue her HEP of stretches 2x daily.  -GC           User Key  (r) = Recorded By, (t) = Taken By, (c) = Cosigned By    Initials Name Provider Type    GC Brett Iyer, PT Physical Therapist                 Modalities     Row Name 05/26/22 1040             Subjective Comments    Subjective Comments Pt states she fell the other day with the sling on and she porfirio her shoulder. She does say it is a little sore today.  -GC              Moist Heat    MH Applied Yes  -GC      Location right shoulder with pt supine and pillow under right elbow and forearm  -GC      PT Moist Heat Minutes 10  -GC      MH Prior to Rx Yes  -GC            User Key  (r) = Recorded By, (t) = Taken By, (c) = Cosigned By    Initials Name Provider Type    GC Brett Iyer, PT Physical Therapist               OP Exercises     Row Name 05/26/22 1040             Subjective Comments    Subjective Comments Pt states she fell the other day with the sling on and she porfirio her shoulder. She does say it is a little sore today.  -GC              Exercise 1    Exercise Name 1 Cane stretch-FLEX  -GC      Cueing 1 Verbal;Demo  -GC      Reps 1 15  -GC      Time 1 5 secs  -GC              Exercise 2    Exercise Name 2 Cane stretch-ABD  -GC      Cueing 2 Verbal;Demo  -GC      Reps 2 15  -GC      Time 2 5 secs  -GC              Exercise 3    Exercise Name 3 Cane stretch-EX  -GC      Cueing 3 Verbal;Demo  -GC      Reps 3 15  -GC      Time 3 5 secs  -GC              Exercise 4    Exercise Name 4 Pulley-FLEX  -GC      Cueing 4 Verbal;Demo  -GC      Time 4 3 min  -GC               Exercise 5    Exercise Name 5 Pulley-Scaption  -      Cueing 5 Verbal;Demo  -GC      Time 5 3 min  -GC            User Key  (r) = Recorded By, (t) = Taken By, (c) = Cosigned By    Initials Name Provider Type     Brett Iyer, PT Physical Therapist                         Manual Rx (last 36 hours)     Manual Treatments     Row Name 05/26/22 1040             Manual Rx 1    Manual Rx 1 Location right shoulder  -      Manual Rx 1 Type PROM in FLEX-ABD-ER-IR per massive rotator cuff protocol  -GC      Manual Rx 1 Duration 20 min  -GC            User Key  (r) = Recorded By, (t) = Taken By, (c) = Cosigned By    Initials Name Provider Type     Brett Iyer PT Physical Therapist                                   Time Calculation:   Start Time: 1040  Stop Time: 1123  Time Calculation (min): 43 min  Untimed Charges  PT Moist Heat Minutes: 10  Total Minutes  Untimed Charges Total Minutes: 10   Total Minutes: 10  Therapy Charges for Today     Code Description Service Date Service Provider Modifiers Qty    81889110958  PT THER PROC EA 15 MIN 5/26/2022 Brett Iyer, PT GP 1    [id number]  PT MANUAL THERAPY EA 15 MIN 5/26/2022 Brett Iyer, PT GP 1                    Brett Iyer PT  5/26/2022

## 2022-06-01 DIAGNOSIS — M75.21 BICEPS TENDINITIS OF RIGHT UPPER EXTREMITY: ICD-10-CM

## 2022-06-01 DIAGNOSIS — M75.41 SUBACROMIAL IMPINGEMENT OF RIGHT SHOULDER: ICD-10-CM

## 2022-06-01 DIAGNOSIS — M75.121 COMPLETE TEAR OF RIGHT ROTATOR CUFF, UNSPECIFIED WHETHER TRAUMATIC: ICD-10-CM

## 2022-06-02 ENCOUNTER — HOSPITAL ENCOUNTER (OUTPATIENT)
Dept: PHYSICAL THERAPY | Facility: HOSPITAL | Age: 52
Setting detail: THERAPIES SERIES
Discharge: HOME | End: 2022-06-02

## 2022-06-02 DIAGNOSIS — Z98.890 S/P ROTATOR CUFF SURGERY: Primary | ICD-10-CM

## 2022-06-02 PROCEDURE — 97140 MANUAL THERAPY 1/> REGIONS: CPT | Performed by: PHYSICAL THERAPIST

## 2022-06-02 RX ORDER — OXYCODONE HYDROCHLORIDE AND ACETAMINOPHEN 5; 325 MG/1; MG/1
TABLET ORAL
Qty: 36 TABLET | Refills: 0 | Status: SHIPPED | OUTPATIENT
Start: 2022-06-02 | End: 2022-07-18

## 2022-06-02 NOTE — TELEPHONE ENCOUNTER
Rx Refill Note  Requested Prescriptions     Pending Prescriptions Disp Refills    oxyCODONE-acetaminophen (PERCOCET) 5-325 MG per tablet 36 tablet 0     Si-2 tablets every 4-6 hours, prn moderate-severe pain      Last office visit with prescribing clinician: 2022      Next office visit with prescribing clinician: Visit date not found   Last Filled:22      Encounter Diagnoses   Name Primary?    Subacromial impingement of right shoulder     Biceps tendinitis of right upper extremity     Complete tear of right rotator cuff, unspecified whether traumatic       Date of Surgery: 2022      Kacey Lawson MA  22, 08:11 EDT    {TIP  Encounters:    {TIP  Please add Last Relevant Lab Date if appropriate:  {TIP  Is Refill Pharmacy correct?:

## 2022-06-02 NOTE — TELEPHONE ENCOUNTER
Rx Refill Note  Requested Prescriptions     Pending Prescriptions Disp Refills    oxyCODONE-acetaminophen (PERCOCET) 5-325 MG per tablet 36 tablet 0     Si-2 tablets every 4-6 hours, prn moderate-severe pain      Last office visit with prescribing clinician: 2022      Next office visit with prescribing clinician: 22   Last Filled:22      Encounter Diagnoses   Name Primary?    Subacromial impingement of right shoulder     Biceps tendinitis of right upper extremity     Complete tear of right rotator cuff, unspecified whether traumatic       Date of Surgery:22      Trang Stack MA  22, 08:13 EDT    {TIP  Encounters:    {TIP  Please add Last Relevant Lab Date if appropriate:  {TIP  Is Refill Pharmacy correct?:

## 2022-06-02 NOTE — THERAPY TREATMENT NOTE
Outpatient Physical Therapy Ortho Treatment Note  ALEC Staton     Patient Name: Julia Lujan  : 1970  MRN: [id number]  Today's Date: 2022      Visit Date: 2022    Visit Dx:    ICD-10-CM ICD-9-CM   1. S/P rotator cuff surgery  Z98.890 V45.89       Patient Active Problem List   Diagnosis   • Essential (primary) hypertension   • Major depressive disorder, single episode, unspecified   • Screen for colon cancer   • Complex tear of medial meniscus of left knee as current injury   • Complete tear of right rotator cuff   • Subacromial impingement of right shoulder   • Biceps tendinitis of right upper extremity   • s/p right shoulder arthroscopy with rotator cuff repair complex with bio inductive implant, biceps tenodesis DOS 2022        Past Medical History:   Diagnosis Date   • Arthritis     neck and ble   • Cervicalgia    • Elevated blood pressure reading without diagnosis of hypertension    • Essential (primary) hypertension    • H/O mammogram 2019   • Headache    • Hematuria, unspecified    • Major depressive disorder, single episode, unspecified    • Menopausal and female climacteric states    • Nevus, non-neoplastic    • Pain in thoracic spine    • Papanicolaou smear    • Pneumonia, unspecified organism    • Radiculopathy, cervical region    • Rib pain    • Unspecified injury of muscle(s) and tendon(s) of the rotator cuff of right shoulder, initial encounter         Past Surgical History:   Procedure Laterality Date   • BREAST IMPLANT SURGERY Bilateral    • COLONOSCOPY W/ POLYPECTOMY N/A 2022    Procedure: COLONOSCOPY WITH POLYPECTOMY;  Surgeon: Clinton Hernandez MD;  Location: [facility];  Service: Gastroenterology;  Laterality: N/A;  sigmoid polyps x 2     • HYSTERECTOMY     • LIPOSUCTION      bilateral thighs   • NECK SURGERY      Les chao   • SHOULDER ARTHROSCOPY W/ ROTATOR CUFF REPAIR Right 2022    Procedure: SHOULDER ARTHROSCOPY WITH  ROTATOR CUFF REPAIR COMPLEX WITH BIOINDUCTIVE IMPLANT, BICEPS TENDONESIS;  Surgeon: Kale Wlikerson MD;  Location: [facility];  Service: Orthopedics;  Laterality: Right;                        PT Assessment/Plan     Row Name 06/02/22 1130          PT Assessment    Assessment Comments Pt is doing well with increasing shoulder rnage noted with her stretches.  -GC            PT Plan    PT Plan Comments Pt is to continue her HEP of stretches 2x daily.  -GC           User Key  (r) = Recorded By, (t) = Taken By, (c) = Cosigned By    Initials Name Provider Type     Brett Iyer, PT Physical Therapist                   OP Exercises     Row Name 06/02/22 1130             Exercise 1    Exercise Name 1 Cane stretch-FLEX  -GC      Cueing 1 Verbal;Demo  -GC      Reps 1 15  -GC      Time 1 5 secs  -GC              Exercise 2    Exercise Name 2 Cane stretch-ABD  -GC      Cueing 2 Verbal;Demo  -GC      Reps 2 15  -GC      Time 2 5 secs  -GC              Exercise 3    Exercise Name 3 Cane stretch-EX  -GC      Cueing 3 Verbal;Demo  -GC      Reps 3 15  -GC      Time 3 5 secs  -GC              Exercise 4    Exercise Name 4 Pulley-FLEX  -GC      Cueing 4 Verbal;Demo  -GC      Time 4 5 min  -GC              Exercise 5    Exercise Name 5 Pulley-Scaption  -GC      Cueing 5 Verbal;Demo  -GC      Time 5 5 min  -GC            User Key  (r) = Recorded By, (t) = Taken By, (c) = Cosigned By    Initials Name Provider Type     Brett Iyer, PT Physical Therapist                                                Time Calculation:   Start Time: 1130  Stop Time: 1215  Time Calculation (min): 45 min  Therapy Charges for Today     Code Description Service Date Service Provider Modifiers Qty    [id number]  PT MANUAL THERAPY EA 15 MIN 6/2/2022 Brett Iyer, PT GP 1                    Brett Iyer PT  6/2/2022

## 2022-06-09 ENCOUNTER — HOSPITAL ENCOUNTER (OUTPATIENT)
Dept: PHYSICAL THERAPY | Facility: HOSPITAL | Age: 52
Setting detail: THERAPIES SERIES
Discharge: HOME | End: 2022-06-09

## 2022-06-09 DIAGNOSIS — Z98.890 S/P ROTATOR CUFF SURGERY: Primary | ICD-10-CM

## 2022-06-09 PROCEDURE — 97140 MANUAL THERAPY 1/> REGIONS: CPT

## 2022-06-09 PROCEDURE — 97110 THERAPEUTIC EXERCISES: CPT

## 2022-06-09 NOTE — THERAPY TREATMENT NOTE
Outpatient Physical Therapy Ortho Treatment Note   Corrina Green     Patient Name: Julia Lujan  : 1970  MRN: [id number]  Today's Date: 2022      Visit Date: 2022    Visit Dx:    ICD-10-CM ICD-9-CM   1. S/P rotator cuff surgery  Z98.890 V45.89       Patient Active Problem List   Diagnosis   • Essential (primary) hypertension   • Major depressive disorder, single episode, unspecified   • Screen for colon cancer   • Complex tear of medial meniscus of left knee as current injury   • Complete tear of right rotator cuff   • Subacromial impingement of right shoulder   • Biceps tendinitis of right upper extremity   • s/p right shoulder arthroscopy with rotator cuff repair complex with bio inductive implant, biceps tenodesis DOS 2022        Past Medical History:   Diagnosis Date   • Arthritis     neck and ble   • Cervicalgia    • Elevated blood pressure reading without diagnosis of hypertension    • Essential (primary) hypertension    • H/O mammogram 2019   • Headache    • Hematuria, unspecified    • Major depressive disorder, single episode, unspecified    • Menopausal and female climacteric states    • Nevus, non-neoplastic    • Pain in thoracic spine    • Papanicolaou smear    • Pneumonia, unspecified organism    • Radiculopathy, cervical region    • Rib pain    • Unspecified injury of muscle(s) and tendon(s) of the rotator cuff of right shoulder, initial encounter         Past Surgical History:   Procedure Laterality Date   • BREAST IMPLANT SURGERY Bilateral    • COLONOSCOPY W/ POLYPECTOMY N/A 2022    Procedure: COLONOSCOPY WITH POLYPECTOMY;  Surgeon: Clinton Hernandez MD;  Location: [facility];  Service: Gastroenterology;  Laterality: N/A;  sigmoid polyps x 2     • HYSTERECTOMY     • LIPOSUCTION      bilateral thighs   • NECK SURGERY      Les chao   • SHOULDER ARTHROSCOPY W/ ROTATOR CUFF REPAIR Right 2022    Procedure: SHOULDER ARTHROSCOPY WITH  "ROTATOR CUFF REPAIR COMPLEX WITH BIOINDUCTIVE IMPLANT, BICEPS TENDONESIS;  Surgeon: Kale Wilkerson MD;  Location:  [facility];  Service: Orthopedics;  Laterality: Right;                        PT Assessment/Plan     Row Name 06/09/22 1100          PT Assessment    Assessment Comments Patient continues to do well with PT and continues to progress as expected at this time.  -AS            PT Plan    PT Plan Comments Patient to see her MD on Monday. Will continue with outpatient PT as advised.  -AS           User Key  (r) = Recorded By, (t) = Taken By, (c) = Cosigned By    Initials Name Provider Type    AS Billy Bee, PT Physical Therapist                 Modalities     Row Name 06/09/22 1100             Moist Heat    MH Applied Yes  -AS      Location right shoulder - sitting  -AS      PT Moist Heat Minutes 10  -AS      MH Prior to Rx Yes  -AS            User Key  (r) = Recorded By, (t) = Taken By, (c) = Cosigned By    Initials Name Provider Type    AS Billy Bee, PT Physical Therapist               OP Exercises     Row Name 06/09/22 1138 06/09/22 1000          Subjective Comments    Subjective Comments -- Patient states her shoulder is \"alright\". She states she does have pain in the bicep area and she can tell \"when I do too much\". She states she is scheduled to see her MD on Monday.  -AS            Total Minutes    09031 - PT Therapeutic Exercise Minutes 15  -AS --     63421 - PT Manual Therapy Minutes 15  -AS --            Exercise 1    Exercise Name 1 -- Cane stretch-FLEX  -AS     Cueing 1 -- Verbal;Demo  -AS     Reps 1 -- 15  -AS     Time 1 -- 5 secs  -AS            Exercise 2    Exercise Name 2 -- Cane stretch-ABD  -AS     Cueing 2 -- Verbal;Demo  -AS     Reps 2 -- 15  -AS     Time 2 -- 5 secs  -AS            Exercise 3    Exercise Name 3 -- Cane stretch-EX  -AS     Cueing 3 -- Verbal;Demo  -AS     Reps 3 -- 15  -AS     Time 3 -- 5 secs  -AS            Exercise 4    Exercise Name 4 -- " Pulley-FLEX  -AS     Cueing 4 -- Verbal;Demo  -AS     Time 4 -- 5 min  -AS            Exercise 5    Exercise Name 5 -- Pulley-Scaption  -AS     Cueing 5 -- Verbal;Demo  -AS     Time 5 -- 5 min  -AS           User Key  (r) = Recorded By, (t) = Taken By, (c) = Cosigned By    Initials Name Provider Type    AS Billy Bee, PT Physical Therapist                         Manual Rx (last 36 hours)     Manual Treatments     Row Name 06/09/22 1138 06/09/22 1100          Total Minutes    33772 - PT Manual Therapy Minutes 15  -AS --            Manual Rx 1    Manual Rx 1 Location -- right shoulder  -AS     Manual Rx 1 Type -- PROM in FLEX-ABD-ER-IR per massive rotator cuff protocol  -AS     Manual Rx 1 Duration -- 15 min  -AS           User Key  (r) = Recorded By, (t) = Taken By, (c) = Cosigned By    Initials Name Provider Type    AS Billy Bee, PT Physical Therapist                                   Time Calculation:   Start Time: 1056  Stop Time: 1138  Time Calculation (min): 42 min  Timed Charges  99297 - PT Therapeutic Exercise Minutes: 15  03205 - PT Manual Therapy Minutes: 15  Untimed Charges  PT Moist Heat Minutes: 10  Total Minutes  Timed Charges Total Minutes: 30  Untimed Charges Total Minutes: 10   Total Minutes: 40  Therapy Charges for Today     Code Description Service Date Service Provider Modifiers Qty    72585154135  PT THER PROC EA 15 MIN 6/9/2022 Billy Bee, PT GP 1    [id number]  PT MANUAL THERAPY EA 15 MIN 6/9/2022 Billy Bee, PT GP 1                    Billy Bee PT  6/9/2022

## 2022-06-13 ENCOUNTER — OFFICE VISIT (OUTPATIENT)
Dept: ORTHOPEDIC SURGERY | Facility: CLINIC | Age: 52
End: 2022-06-13

## 2022-06-13 VITALS — WEIGHT: 171.3 LBS | HEIGHT: 64 IN | BODY MASS INDEX: 29.24 KG/M2

## 2022-06-13 DIAGNOSIS — Z98.890 STATUS POST ARTHROSCOPY OF SHOULDER: Primary | ICD-10-CM

## 2022-06-13 PROCEDURE — 99024 POSTOP FOLLOW-UP VISIT: CPT | Performed by: ORTHOPAEDIC SURGERY

## 2022-06-13 RX ORDER — PREDNISONE 10 MG/1
TABLET ORAL
Qty: 39 TABLET | Refills: 0 | Status: SHIPPED | OUTPATIENT
Start: 2022-06-13 | End: 2022-07-18

## 2022-06-16 ENCOUNTER — HOSPITAL ENCOUNTER (OUTPATIENT)
Dept: PHYSICAL THERAPY | Facility: HOSPITAL | Age: 52
Setting detail: THERAPIES SERIES
Discharge: HOME | End: 2022-06-16

## 2022-06-16 DIAGNOSIS — Z98.890 S/P ROTATOR CUFF SURGERY: Primary | ICD-10-CM

## 2022-06-16 PROCEDURE — 97110 THERAPEUTIC EXERCISES: CPT | Performed by: PHYSICAL THERAPIST

## 2022-06-16 PROCEDURE — 97140 MANUAL THERAPY 1/> REGIONS: CPT | Performed by: PHYSICAL THERAPIST

## 2022-06-16 NOTE — THERAPY TREATMENT NOTE
Outpatient Physical Therapy Ortho Treatment Note   Corrina Green     Patient Name: Julia Lujan  : 1970  MRN: [id number]  Today's Date: 2022      Visit Date: 2022    Visit Dx:    ICD-10-CM ICD-9-CM   1. S/P rotator cuff surgery  Z98.890 V45.89       Patient Active Problem List   Diagnosis   • Essential (primary) hypertension   • Major depressive disorder, single episode, unspecified   • Screen for colon cancer   • Complex tear of medial meniscus of left knee as current injury   • Complete tear of right rotator cuff   • Subacromial impingement of right shoulder   • Biceps tendinitis of right upper extremity   • s/p right shoulder arthroscopy with rotator cuff repair complex with bio inductive implant, biceps tenodesis DOS 2022        Past Medical History:   Diagnosis Date   • Arthritis     neck and ble   • Cervicalgia    • Elevated blood pressure reading without diagnosis of hypertension    • Essential (primary) hypertension    • H/O mammogram 2019   • Headache    • Hematuria, unspecified    • Major depressive disorder, single episode, unspecified    • Menopausal and female climacteric states    • Nevus, non-neoplastic    • Pain in thoracic spine    • Papanicolaou smear    • Pneumonia, unspecified organism    • Radiculopathy, cervical region    • Rib pain    • Unspecified injury of muscle(s) and tendon(s) of the rotator cuff of right shoulder, initial encounter         Past Surgical History:   Procedure Laterality Date   • BREAST IMPLANT SURGERY Bilateral    • COLONOSCOPY W/ POLYPECTOMY N/A 2022    Procedure: COLONOSCOPY WITH POLYPECTOMY;  Surgeon: Clinton Hernandez MD;  Location: [facility];  Service: Gastroenterology;  Laterality: N/A;  sigmoid polyps x 2     • HYSTERECTOMY     • LIPOSUCTION      bilateral thighs   • NECK SURGERY      Les chao   • SHOULDER ARTHROSCOPY W/ ROTATOR CUFF REPAIR Right 2022    Procedure: SHOULDER ARTHROSCOPY WITH  ROTATOR CUFF REPAIR COMPLEX WITH BIOINDUCTIVE IMPLANT, BICEPS TENDONESIS;  Surgeon: Kale Wilkerson MD;  Location:  [facility];  Service: Orthopedics;  Laterality: Right;                        PT Assessment/Plan     Row Name 06/16/22 1100          PT Assessment    Assessment Comments Pt is doing well with increasing shoulder ROM and good tolerance ot her exercise progression.  -GC            PT Plan    PT Plan Comments Pt is to continue her HEP daily.  -GC           User Key  (r) = Recorded By, (t) = Taken By, (c) = Cosigned By    Initials Name Provider Type    GC Brett Iyer, PT Physical Therapist                 Modalities     Row Name 06/16/22 1100             Subjective Comments    Subjective Comments Pt states her shoulder still hurts  -GC              Moist Heat    MH Applied Yes  -GC      Location right shoulder - sitting  -GC      PT Moist Heat Minutes 10  -GC      MH Prior to Rx Yes  -GC            User Key  (r) = Recorded By, (t) = Taken By, (c) = Cosigned By    Initials Name Provider Type    GC Brett Iyer, PT Physical Therapist               OP Exercises     Row Name 06/16/22 1100             Subjective Comments    Subjective Comments Pt states her shoulder still hurts  -GC              Exercise 1    Exercise Name 1 Cane stretch-FLEX  -GC      Cueing 1 Verbal;Demo  -GC      Reps 1 15  -GC      Time 1 5 secs  -GC              Exercise 2    Exercise Name 2 Cane stretch-ABD  -GC      Cueing 2 Verbal;Demo  -GC      Reps 2 15  -GC      Time 2 5 secs  -GC              Exercise 3    Exercise Name 3 Cane stretch-EX  -GC      Cueing 3 Verbal;Demo  -GC      Reps 3 15  -GC      Time 3 5 secs  -GC              Exercise 4    Exercise Name 4 Pulley-FLEX  -GC      Cueing 4 Verbal;Demo  -GC      Time 4 5 min  -GC              Exercise 5    Exercise Name 5 Pulley-Scaption  -GC      Cueing 5 Verbal;Demo  -GC      Time 5 5 min  -GC              Exercise 6    Exercise Name 6 Shoulder ER vs theraband  -GC       Cueing 6 Verbal;Demo  -GC      Reps 6 25  -GC      Time 6 yellow  -GC              Exercise 7    Exercise Name 7 Shoulder IR vs theraband  -GC      Cueing 7 Verbal;Demo  -GC      Reps 7 25  -GC      Time 7 yellow  -GC              Exercise 8    Exercise Name 8 Shoulder Rows vs therabamd  -GC      Cueing 8 Verbal;Demo  -GC      Reps 8 25  -GC      Time 8 yellow  -GC              Exercise 9    Exercise Name 9 Shoulder EXT vs theraband  -GC      Cueing 9 Verbal;Demo  -GC      Reps 9 25  -GC      Time 9 yellow  -GC              Exercise 10    Exercise Name 10 Scaption Up  -GC      Cueing 10 Verbal;Demo  -GC      Reps 10 25  -GC              Exercise 11    Exercise Name 11 Scaption Down  -GC      Cueing 11 Verbal;Demo  -GC      Reps 11 25  -GC            User Key  (r) = Recorded By, (t) = Taken By, (c) = Cosigned By    Initials Name Provider Type     Brett Iyer, PT Physical Therapist                         Manual Rx (last 36 hours)     Manual Treatments     Row Name 06/16/22 1100             Manual Rx 1    Manual Rx 1 Location right shoulder  -GC      Manual Rx 1 Type PROM in FLEX-ABD-ER-IR per massive rotator cuff protocol  -GC      Manual Rx 1 Duration 15 min  -GC            User Key  (r) = Recorded By, (t) = Taken By, (c) = Cosigned By    Initials Name Provider Type     Brett Iyer PT Physical Therapist                                   Time Calculation:   Start Time: 1100  Stop Time: 1159  Time Calculation (min): 59 min  Untimed Charges  PT Moist Heat Minutes: 10  Total Minutes  Untimed Charges Total Minutes: 10   Total Minutes: 10  Therapy Charges for Today     Code Description Service Date Service Provider Modifiers Qty    31904503284 HC PT THER PROC EA 15 MIN 6/16/2022 Brett Iyer, PT GP 1    28021849402 HC PT MANUAL THERAPY EA 15 MIN 6/16/2022 Brett Iyer, PT GP 1                    Brett Iyer PT  6/16/2022

## 2022-06-23 ENCOUNTER — HOSPITAL ENCOUNTER (OUTPATIENT)
Dept: PHYSICAL THERAPY | Facility: HOSPITAL | Age: 52
Setting detail: THERAPIES SERIES
Discharge: HOME | End: 2022-06-23

## 2022-06-23 DIAGNOSIS — Z98.890 S/P ROTATOR CUFF SURGERY: Primary | ICD-10-CM

## 2022-06-23 PROCEDURE — 97110 THERAPEUTIC EXERCISES: CPT | Performed by: PHYSICAL THERAPIST

## 2022-06-23 PROCEDURE — 97140 MANUAL THERAPY 1/> REGIONS: CPT | Performed by: PHYSICAL THERAPIST

## 2022-06-23 NOTE — THERAPY TREATMENT NOTE
Outpatient Physical Therapy Ortho Treatment Note   Corrina Green     Patient Name: Julia Lujan  : 1970  MRN: [id number]  Today's Date: 2022      Visit Date: 2022    Visit Dx:    ICD-10-CM ICD-9-CM   1. S/P rotator cuff surgery  Z98.890 V45.89       Patient Active Problem List   Diagnosis   • Essential (primary) hypertension   • Major depressive disorder, single episode, unspecified   • Screen for colon cancer   • Complex tear of medial meniscus of left knee as current injury   • Complete tear of right rotator cuff   • Subacromial impingement of right shoulder   • Biceps tendinitis of right upper extremity   • s/p right shoulder arthroscopy with rotator cuff repair complex with bio inductive implant, biceps tenodesis DOS 2022        Past Medical History:   Diagnosis Date   • Arthritis     neck and ble   • Cervicalgia    • Elevated blood pressure reading without diagnosis of hypertension    • Essential (primary) hypertension    • H/O mammogram 2019   • Headache    • Hematuria, unspecified    • Major depressive disorder, single episode, unspecified    • Menopausal and female climacteric states    • Nevus, non-neoplastic    • Pain in thoracic spine    • Papanicolaou smear    • Pneumonia, unspecified organism    • Radiculopathy, cervical region    • Rib pain    • Unspecified injury of muscle(s) and tendon(s) of the rotator cuff of right shoulder, initial encounter         Past Surgical History:   Procedure Laterality Date   • BREAST IMPLANT SURGERY Bilateral    • COLONOSCOPY W/ POLYPECTOMY N/A 2022    Procedure: COLONOSCOPY WITH POLYPECTOMY;  Surgeon: Clinton Hernandez MD;  Location: [facility];  Service: Gastroenterology;  Laterality: N/A;  sigmoid polyps x 2     • HYSTERECTOMY     • LIPOSUCTION      bilateral thighs   • NECK SURGERY      Les chao   • SHOULDER ARTHROSCOPY W/ ROTATOR CUFF REPAIR Right 2022    Procedure: SHOULDER ARTHROSCOPY WITH  ROTATOR CUFF REPAIR COMPLEX WITH BIOINDUCTIVE IMPLANT, BICEPS TENDONESIS;  Surgeon: Kale Wilkerson MD;  Location:  [facility];  Service: Orthopedics;  Laterality: Right;                        PT Assessment/Plan     Row Name 06/23/22 1200          PT Assessment    Assessment Comments Pt is doing well with increasing shoulder range and good tolerance to her exercise progression.  -GC            PT Plan    PT Plan Comments Pt is to continue her HEP 2x daily.  -GC           User Key  (r) = Recorded By, (t) = Taken By, (c) = Cosigned By    Initials Name Provider Type    GC Brett Iyer, PT Physical Therapist                 Modalities     Row Name 06/23/22 1200             Moist Heat    MH Applied Yes  -GC      Location right shoulder - sitting  -GC      PT Moist Heat Minutes 10  -GC      MH Prior to Rx Yes  -GC            User Key  (r) = Recorded By, (t) = Taken By, (c) = Cosigned By    Initials Name Provider Type    GC Brett Iyer, PT Physical Therapist               OP Exercises     Row Name 06/23/22 1200             Subjective Comments    Subjective Comments Pt states ehr shoulder is feeling better.  -GC              Exercise 1    Exercise Name 1 Cane stretch-FLEX  -GC      Cueing 1 Verbal;Demo  -GC      Reps 1 15  -GC      Time 1 5 secs  -GC              Exercise 2    Exercise Name 2 Cane stretch-ABD  -GC      Cueing 2 Verbal;Demo  -GC      Reps 2 15  -GC      Time 2 5 secs  -GC              Exercise 3    Exercise Name 3 Cane stretch-EX  -GC      Cueing 3 Verbal;Demo  -GC      Reps 3 15  -GC      Time 3 5 secs  -GC              Exercise 4    Exercise Name 4 Pulley-FLEX  -GC      Cueing 4 Verbal;Demo  -GC      Time 4 5 min  -GC              Exercise 5    Exercise Name 5 Pulley-Scaption  -GC      Cueing 5 Verbal;Demo  -GC      Time 5 5 min  -GC              Exercise 6    Exercise Name 6 Shoulder ER vs theraband  -GC      Cueing 6 Verbal;Demo  -GC      Reps 6 25  -GC      Time 6 red  -GC              Exercise  7    Exercise Name 7 Shoulder IR vs theraband  -GC      Cueing 7 Verbal;Demo  -GC      Reps 7 25  -GC      Time 7 yellow  -GC              Exercise 8    Exercise Name 8 Shoulder Rows vs therabamd  -GC      Cueing 8 Verbal;Demo  -GC      Reps 8 25  -GC      Time 8 red  -GC              Exercise 9    Exercise Name 9 Shoulder EXT vs theraband  -GC      Cueing 9 Verbal;Demo  -GC      Reps 9 25  -GC      Time 9 red  -GC              Exercise 10    Exercise Name 10 Scaption Up  -GC      Cueing 10 Verbal;Demo  -GC      Reps 10 25  -GC              Exercise 11    Exercise Name 11 Scaption Down  -GC      Cueing 11 Verbal;Demo  -GC      Reps 11 25  -GC              Exercise 12    Exercise Name 12 sidelying shoulder ER  -GC      Cueing 12 Verbal;Demo  -GC      Time 12 25  -GC            User Key  (r) = Recorded By, (t) = Taken By, (c) = Cosigned By    Initials Name Provider Type     Brett Iyer, PT Physical Therapist                         Manual Rx (last 36 hours)     Manual Treatments     Row Name 06/23/22 1200             Manual Rx 1    Manual Rx 1 Location right shoulder  -GC      Manual Rx 1 Type PROM in FLEX-ABD-ER-IR per massive rotator cuff protocol  -GC      Manual Rx 1 Duration 15 min  -GC            User Key  (r) = Recorded By, (t) = Taken By, (c) = Cosigned By    Initials Name Provider Type     Brett Iyer PT Physical Therapist                                   Time Calculation:   Start Time: 1200  Stop Time: 1302  Time Calculation (min): 62 min  Untimed Charges  PT Moist Heat Minutes: 10  Total Minutes  Untimed Charges Total Minutes: 10   Total Minutes: 10  Therapy Charges for Today     Code Description Service Date Service Provider Modifiers Qty    54452980026 HC PT THER PROC EA 15 MIN 6/23/2022 Brett Iyer, PT GP 1    [id number] HC PT MANUAL THERAPY EA 15 MIN 6/23/2022 Brett Iyer, PT GP 1                    Brett Iyer PT  6/23/2022

## 2022-06-30 ENCOUNTER — HOSPITAL ENCOUNTER (OUTPATIENT)
Dept: PHYSICAL THERAPY | Facility: HOSPITAL | Age: 52
Setting detail: THERAPIES SERIES
Discharge: HOME | End: 2022-06-30

## 2022-06-30 DIAGNOSIS — Z98.890 S/P ROTATOR CUFF SURGERY: Primary | ICD-10-CM

## 2022-06-30 PROCEDURE — 97110 THERAPEUTIC EXERCISES: CPT | Performed by: PHYSICAL THERAPIST

## 2022-06-30 PROCEDURE — 97140 MANUAL THERAPY 1/> REGIONS: CPT | Performed by: PHYSICAL THERAPIST

## 2022-07-07 ENCOUNTER — HOSPITAL ENCOUNTER (OUTPATIENT)
Dept: PHYSICAL THERAPY | Facility: HOSPITAL | Age: 52
Setting detail: THERAPIES SERIES
Discharge: HOME | End: 2022-07-07

## 2022-07-07 DIAGNOSIS — Z98.890 S/P ROTATOR CUFF SURGERY: Primary | ICD-10-CM

## 2022-07-07 PROCEDURE — 97110 THERAPEUTIC EXERCISES: CPT | Performed by: PHYSICAL THERAPIST

## 2022-07-07 PROCEDURE — 97140 MANUAL THERAPY 1/> REGIONS: CPT | Performed by: PHYSICAL THERAPIST

## 2022-07-07 NOTE — THERAPY TREATMENT NOTE
Outpatient Physical Therapy Ortho Treatment Note  ALEC Staton     Patient Name: Julia Lujan  : 1970  MRN: [id number]  Today's Date: 2022      Visit Date: 2022    Visit Dx:    ICD-10-CM ICD-9-CM   1. S/P rotator cuff surgery  Z98.890 V45.89       Patient Active Problem List   Diagnosis   • Essential (primary) hypertension   • Major depressive disorder, single episode, unspecified   • Screen for colon cancer   • Complex tear of medial meniscus of left knee as current injury   • Complete tear of right rotator cuff   • Subacromial impingement of right shoulder   • Biceps tendinitis of right upper extremity   • s/p right shoulder arthroscopy with rotator cuff repair complex with bio inductive implant, biceps tenodesis DOS 2022        Past Medical History:   Diagnosis Date   • Arthritis     neck and ble   • Cervicalgia    • Elevated blood pressure reading without diagnosis of hypertension    • Essential (primary) hypertension    • H/O mammogram 2019   • Headache    • Hematuria, unspecified    • Major depressive disorder, single episode, unspecified    • Menopausal and female climacteric states    • Nevus, non-neoplastic    • Pain in thoracic spine    • Papanicolaou smear    • Pneumonia, unspecified organism    • Radiculopathy, cervical region    • Rib pain    • Unspecified injury of muscle(s) and tendon(s) of the rotator cuff of right shoulder, initial encounter         Past Surgical History:   Procedure Laterality Date   • BREAST IMPLANT SURGERY Bilateral    • COLONOSCOPY W/ POLYPECTOMY N/A 2022    Procedure: COLONOSCOPY WITH POLYPECTOMY;  Surgeon: Clinton Hernandez MD;  Location: [facility];  Service: Gastroenterology;  Laterality: N/A;  sigmoid polyps x 2     • HYSTERECTOMY     • LIPOSUCTION      bilateral thighs   • NECK SURGERY      Les chao   • SHOULDER ARTHROSCOPY W/ ROTATOR CUFF REPAIR Right 2022    Procedure: SHOULDER ARTHROSCOPY WITH  ROTATOR CUFF REPAIR COMPLEX WITH BIOINDUCTIVE IMPLANT, BICEPS TENDONESIS;  Surgeon: Kale Wilkerson MD;  Location:  [facility];  Service: Orthopedics;  Laterality: Right;                        PT Assessment/Plan     Row Name 07/07/22 1130          PT Assessment    Assessment Comments Pt tolerated increased resistance with exercises well. She continues to show good progression with her ROM.  -GC            PT Plan    PT Plan Comments Pt is to continue her HEP daily.  -GC           User Key  (r) = Recorded By, (t) = Taken By, (c) = Cosigned By    Initials Name Provider Type    GC Brett Iyer, PT Physical Therapist                 Modalities     Row Name 07/07/22 1130             Subjective Comments    Subjective Comments Pt states her shoulder is still a little sore.  -GC              Moist Heat    MH Applied Yes  -GC      Location right shoulder - sitting  -GC      PT Moist Heat Minutes 10  -GC      MH Prior to Rx Yes  -GC            User Key  (r) = Recorded By, (t) = Taken By, (c) = Cosigned By    Initials Name Provider Type    GC Brett Iyer, PT Physical Therapist               OP Exercises     Row Name 07/07/22 1130             Subjective Comments    Subjective Comments Pt states her shoulder is still a little sore.  -GC              Exercise 1    Exercise Name 1 Cane stretch-FLEX  -GC      Cueing 1 Verbal;Demo  -GC      Reps 1 15  -GC      Time 1 5 secs  -GC              Exercise 2    Exercise Name 2 Cane stretch-ABD  -GC      Cueing 2 Verbal;Demo  -GC      Reps 2 15  -GC      Time 2 5 secs  -GC              Exercise 3    Exercise Name 3 Cane stretch-EX  -GC      Cueing 3 Verbal;Demo  -GC      Reps 3 15  -GC      Time 3 5 secs  -GC              Exercise 4    Exercise Name 4 Pulley-FLEX  -GC      Cueing 4 Verbal;Demo  -GC      Time 4 5 min  -GC              Exercise 5    Exercise Name 5 Pulley-Scaption  -GC      Cueing 5 Verbal;Demo  -GC      Time 5 5 min  -GC              Exercise 6    Exercise Name 6  Shoulder ER vs theraband  -GC      Cueing 6 Verbal;Demo  -GC      Reps 6 25  -GC      Time 6 green  -GC              Exercise 7    Exercise Name 7 Shoulder IR vs theraband  -GC      Cueing 7 Verbal;Demo  -GC      Reps 7 25  -GC      Time 7 green  -GC              Exercise 8    Exercise Name 8 Shoulder Rows vs therabamd  -GC      Cueing 8 Verbal;Demo  -GC      Reps 8 25  -GC      Time 8 green  -GC              Exercise 9    Exercise Name 9 Shoulder EXT vs theraband  -GC      Cueing 9 Verbal;Demo  -GC      Reps 9 25  -GC      Time 9 green  -GC              Exercise 10    Exercise Name 10 Scaption Up  -GC      Cueing 10 Verbal;Demo  -GC      Reps 10 25  -GC      Time 10 2#  -GC              Exercise 11    Exercise Name 11 Scaption Down  -GC      Cueing 11 Verbal;Demo  -GC      Reps 11 25  -GC      Time 11 2#  -GC              Exercise 12    Exercise Name 12 sidelying shoulder ER  -GC      Cueing 12 Verbal;Demo  -GC      Time 12 25  -GC              Exercise 13    Exercise Name 13 Prone Hor ABD 90  -GC      Cueing 13 Verbal;Tactile  -GC      Reps 13 25  -GC              Exercise 14    Exercise Name 14 Prone Hor   -GC      Cueing 14 Verbal;Tactile  -GC      Reps 14 25  -GC            User Key  (r) = Recorded By, (t) = Taken By, (c) = Cosigned By    Initials Name Provider Type    GC Brett Iyer, PT Physical Therapist                         Manual Rx (last 36 hours)     Manual Treatments     Row Name 07/07/22 1130             Manual Rx 1    Manual Rx 1 Location right shoulder  -GC      Manual Rx 1 Type PROM in FLEX-ABD-ER-IR per massive rotator cuff protocol  -GC      Manual Rx 1 Duration 15 min  -GC            User Key  (r) = Recorded By, (t) = Taken By, (c) = Cosigned By    Initials Name Provider Type    GC Brett Iyer, PT Physical Therapist                                   Time Calculation:   Start Time: 1130  Stop Time: 1241  Time Calculation (min): 71 min  Untimed Charges  PT Moist Heat Minutes:  10  Total Minutes  Untimed Charges Total Minutes: 10   Total Minutes: 10  Therapy Charges for Today     Code Description Service Date Service Provider Modifiers Qty    51285593909 HC PT THER PROC EA 15 MIN 7/7/2022 Brett Iyer, PT GP 1    [id number]  PT MANUAL THERAPY EA 15 MIN 7/7/2022 Brett Iyer, PT GP 1                    Brett Iyer, PT  7/7/2022

## 2022-07-14 ENCOUNTER — HOSPITAL ENCOUNTER (OUTPATIENT)
Dept: PHYSICAL THERAPY | Facility: HOSPITAL | Age: 52
Setting detail: THERAPIES SERIES
Discharge: HOME | End: 2022-07-14

## 2022-07-14 DIAGNOSIS — Z98.890 S/P ROTATOR CUFF SURGERY: Primary | ICD-10-CM

## 2022-07-14 PROCEDURE — 97110 THERAPEUTIC EXERCISES: CPT | Performed by: PHYSICAL THERAPIST

## 2022-07-14 PROCEDURE — 97140 MANUAL THERAPY 1/> REGIONS: CPT | Performed by: PHYSICAL THERAPIST

## 2022-07-14 NOTE — THERAPY TREATMENT NOTE
Outpatient Physical Therapy Ortho Treatment Note   Corrina Green     Patient Name: Julia Lujan  : 1970  MRN: [id number]  Today's Date: 2022      Visit Date: 2022    Visit Dx:    ICD-10-CM ICD-9-CM   1. S/P rotator cuff surgery  Z98.890 V45.89       Patient Active Problem List   Diagnosis   • Essential (primary) hypertension   • Major depressive disorder, single episode, unspecified   • Screen for colon cancer   • Complex tear of medial meniscus of left knee as current injury   • Complete tear of right rotator cuff   • Subacromial impingement of right shoulder   • Biceps tendinitis of right upper extremity   • s/p right shoulder arthroscopy with rotator cuff repair complex with bio inductive implant, biceps tenodesis DOS 2022        Past Medical History:   Diagnosis Date   • Arthritis     neck and ble   • Cervicalgia    • Elevated blood pressure reading without diagnosis of hypertension    • Essential (primary) hypertension    • H/O mammogram 2019   • Headache    • Hematuria, unspecified    • Major depressive disorder, single episode, unspecified    • Menopausal and female climacteric states    • Nevus, non-neoplastic    • Pain in thoracic spine    • Papanicolaou smear    • Pneumonia, unspecified organism    • Radiculopathy, cervical region    • Rib pain    • Unspecified injury of muscle(s) and tendon(s) of the rotator cuff of right shoulder, initial encounter         Past Surgical History:   Procedure Laterality Date   • BREAST IMPLANT SURGERY Bilateral    • COLONOSCOPY W/ POLYPECTOMY N/A 2022    Procedure: COLONOSCOPY WITH POLYPECTOMY;  Surgeon: Clinton Hernandez MD;  Location: [facility];  Service: Gastroenterology;  Laterality: N/A;  sigmoid polyps x 2     • HYSTERECTOMY     • LIPOSUCTION      bilateral thighs   • NECK SURGERY      Les chao   • SHOULDER ARTHROSCOPY W/ ROTATOR CUFF REPAIR Right 2022    Procedure: SHOULDER ARTHROSCOPY WITH  ROTATOR CUFF REPAIR COMPLEX WITH BIOINDUCTIVE IMPLANT, BICEPS TENDONESIS;  Surgeon: Kale Wilkerson MD;  Location:  [facility];  Service: Orthopedics;  Laterality: Right;                        PT Assessment/Plan     Row Name 07/14/22 1030          PT Assessment    Assessment Comments Pt is doing very well with near full PROM noted this morning.  -GC            PT Plan    PT Plan Comments Pt is to contine her HEP daily.  -GC           User Key  (r) = Recorded By, (t) = Taken By, (c) = Cosigned By    Initials Name Provider Type    GC Brett Iyer, PT Physical Therapist                 Modalities     Row Name 07/14/22 1030             Moist Heat    MH Applied Yes  -GC      Location right shoulder - sitting  -GC      PT Moist Heat Minutes 10  -GC      MH Prior to Rx Yes  -GC            User Key  (r) = Recorded By, (t) = Taken By, (c) = Cosigned By    Initials Name Provider Type    GC Brett Iyer, PT Physical Therapist               OP Exercises     Row Name 07/14/22 1030             Subjective Comments    Subjective Comments Pt states she must have slept on it wrong last night as it is sore this morning. She also states she is going back to work next week.  -GC              Exercise 1    Exercise Name 1 Cane stretch-FLEX  -GC      Cueing 1 Verbal;Demo  -GC      Reps 1 15  -GC      Time 1 5 secs  -GC              Exercise 2    Exercise Name 2 Cane stretch-ABD  -GC      Cueing 2 Verbal;Demo  -GC      Reps 2 15  -GC      Time 2 5 secs  -GC              Exercise 3    Exercise Name 3 Cane stretch-EX  -GC      Cueing 3 Verbal;Demo  -GC      Reps 3 15  -GC      Time 3 5 secs  -GC              Exercise 4    Exercise Name 4 Pulley-FLEX  -GC      Cueing 4 Verbal;Demo  -GC      Time 4 5 min  -GC              Exercise 5    Exercise Name 5 Pulley-Scaption  -GC      Cueing 5 Verbal;Demo  -GC      Time 5 5 min  -GC              Exercise 6    Exercise Name 6 Shoulder ER vs theraband  -GC      Cueing 6 Verbal;Demo  -GC      Reps  6 25  -GC      Time 6 green  -GC              Exercise 7    Exercise Name 7 Shoulder IR vs theraband  -GC      Cueing 7 Verbal;Demo  -GC      Reps 7 25  -GC      Time 7 green  -GC              Exercise 8    Exercise Name 8 Shoulder Rows vs therabamd  -GC      Cueing 8 Verbal;Demo  -GC      Reps 8 25  -GC      Time 8 green  -GC              Exercise 9    Exercise Name 9 Shoulder EXT vs theraband  -GC      Cueing 9 Verbal;Demo  -GC      Reps 9 25  -GC      Time 9 green  -GC              Exercise 10    Exercise Name 10 Scaption Up  -GC      Cueing 10 Verbal;Demo  -GC      Reps 10 25  -GC      Time 10 2#  -GC              Exercise 11    Exercise Name 11 Scaption Down  -GC      Cueing 11 Verbal;Demo  -GC      Reps 11 25  -GC      Time 11 2#  -GC              Exercise 12    Exercise Name 12 sidelying shoulder ER  -GC      Cueing 12 Verbal;Demo  -GC      Time 12 25  -GC              Exercise 13    Exercise Name 13 Prone Hor ABD 90  -GC      Cueing 13 Verbal;Tactile  -GC      Reps 13 25  -GC      Time 13 2#  -GC              Exercise 14    Exercise Name 14 Prone Hor   -GC      Cueing 14 Verbal;Tactile  -GC      Reps 14 25  -GC      Time 14 2#  -GC            User Key  (r) = Recorded By, (t) = Taken By, (c) = Cosigned By    Initials Name Provider Type    GC Brett Iyer, PT Physical Therapist                         Manual Rx (last 36 hours)     Manual Treatments     Row Name 07/14/22 1030             Manual Rx 1    Manual Rx 1 Location right shoulder  -GC      Manual Rx 1 Type PROM in FLEX-ABD-ER-IR per massive rotator cuff protocol  -GC      Manual Rx 1 Duration 15 min  -GC            User Key  (r) = Recorded By, (t) = Taken By, (c) = Cosigned By    Initials Name Provider Type    GC Brett Iyer, PT Physical Therapist                                   Time Calculation:   Start Time: 1030  Stop Time: 1132  Time Calculation (min): 62 min  Untimed Charges  PT Moist Heat Minutes: 10  Total Minutes  Untimed Charges  Total Minutes: 10   Total Minutes: 10  Therapy Charges for Today     Code Description Service Date Service Provider Modifiers Qty    75988051829  PT THER PROC EA 15 MIN 7/14/2022 Brett Iyer, PT GP 1    [id number]  PT MANUAL THERAPY EA 15 MIN 7/14/2022 Brett Iyer, PT GP 1                    Brett Iyer, PT  7/14/2022

## 2022-07-18 ENCOUNTER — OFFICE VISIT (OUTPATIENT)
Dept: ORTHOPEDIC SURGERY | Facility: CLINIC | Age: 52
End: 2022-07-18

## 2022-07-18 VITALS — HEIGHT: 64 IN | BODY MASS INDEX: 27.66 KG/M2 | WEIGHT: 162 LBS

## 2022-07-18 DIAGNOSIS — M75.41 SUBACROMIAL IMPINGEMENT OF RIGHT SHOULDER: ICD-10-CM

## 2022-07-18 DIAGNOSIS — Z98.890 STATUS POST ARTHROSCOPY OF SHOULDER: Primary | ICD-10-CM

## 2022-07-18 DIAGNOSIS — M75.01 ADHESIVE CAPSULITIS OF RIGHT SHOULDER: ICD-10-CM

## 2022-07-18 PROCEDURE — 99213 OFFICE O/P EST LOW 20 MIN: CPT | Performed by: ORTHOPAEDIC SURGERY

## 2022-07-18 RX ORDER — MELOXICAM 15 MG/1
15 TABLET ORAL DAILY
Qty: 30 TABLET | Refills: 0 | Status: SHIPPED | OUTPATIENT
Start: 2022-07-18

## 2022-07-18 NOTE — PROGRESS NOTES
Subjective:     Patient ID: Julia Lujan is a 52 y.o. female.    Chief Complaint:   F/u s/p right shoulder rotator cuff repair and biceps tenodesis  DOS 4/12/2022  Plan last visit-prednisone taper, Pennsaid topical cream    History of Present Illness  Julia Lujan returns to clinic today for evaluation of status post right shoulder rotator cuff repair.    The patient is doing well overall, and notes improvement in her symptoms with steroid. She is attending physical therapy 1 time per week and notes she is increasing activities in the right shoulder as well. She reports that she is returning to work 3 days per week.      Social History     Occupational History   • Occupation:    Tobacco Use   • Smoking status: Former Smoker     Packs/day: 0.50     Years: 20.00     Pack years: 10.00     Types: Cigarettes, Electronic Cigarette     Start date: 1/20/1999     Quit date: 4/1/2019     Years since quitting: 3.3   • Smokeless tobacco: Never Used   • Tobacco comment: QUIT 2019 STATED AGE 13   Vaping Use   • Vaping Use: Never used   Substance and Sexual Activity   • Alcohol use: Yes     Alcohol/week: 10.0 standard drinks     Types: 10 Shots of liquor per week     Comment: WINE 5X A WEEK   • Drug use: Yes     Types: Marijuana     Comment: ROUTINELY   • Sexual activity: Yes     Partners: Male     Birth control/protection: Surgical      Past Medical History:   Diagnosis Date   • Arthritis     neck and ble   • Arthritis of neck Jan 2021   • Cervicalgia    • CTS (carpal tunnel syndrome) 2021   • Elevated blood pressure reading without diagnosis of hypertension    • Essential (primary) hypertension    • H/O mammogram 01/2019   • Headache    • Hematuria, unspecified    • Knee swelling 2022   • Major depressive disorder, single episode, unspecified    • Menopausal and female climacteric states    • Nevus, non-neoplastic    • Pain in thoracic spine    • Papanicolaou smear 2017   • Pneumonia, unspecified organism   "  • Radiculopathy, cervical region    • Rib pain    • Rotator cuff syndrome March , 2022   • Tear of meniscus of knee 2022   • Unspecified injury of muscle(s) and tendon(s) of the rotator cuff of right shoulder, initial encounter      Past Surgical History:   Procedure Laterality Date   • BREAST IMPLANT SURGERY Bilateral 2000   • COLONOSCOPY W/ POLYPECTOMY N/A 03/11/2022    Procedure: COLONOSCOPY WITH POLYPECTOMY;  Surgeon: Clinton Hernandez MD;  Location: [facility];  Service: Gastroenterology;  Laterality: N/A;  sigmoid polyps x 2     • HYSTERECTOMY     • LIPOSUCTION      bilateral thighs   • NECK SURGERY  2021    Les chao   • SHOULDER ARTHROSCOPY W/ ROTATOR CUFF REPAIR Right 04/12/2022    Procedure: SHOULDER ARTHROSCOPY WITH ROTATOR CUFF REPAIR COMPLEX WITH BIOINDUCTIVE IMPLANT, BICEPS TENDONESIS;  Surgeon: Kale Wilkerson MD;  Location: [facility];  Service: Orthopedics;  Laterality: Right;   • SHOULDER SURGERY  April 12,2022       Family History   Problem Relation Age of Onset   • Lymphoma Mother    • Cancer Mother         Passed away 2019   • Other Father         BLADDER CANCER   • Cancer Father         Recovered   • Scoliosis Father    • Breast cancer Maternal Grandmother    • Cancer Maternal Grandmother         Passed away 2019   • Other Maternal Uncle         BRACA GENE+   • Cancer Maternal Uncle         Passed away 2020         Review of Systems        Objective:  Vitals:    07/18/22 1320   Weight: 73.5 kg (162 lb)   Height: 162.6 cm (64\")         07/18/22  1320   Weight: 73.5 kg (162 lb)     Body mass index is 27.81 kg/m².  Physical Exam    Vital signs reviewed.   General: No acute distress, alert and oriented  Eyes: conjunctiva clear; pupils equally round and reactive  ENT: external ears and nose atraumatic; oropharynx clear  CV: no peripheral edema  Resp: normal respiratory effort  Skin: no rashes or wounds; normal turgor  Psych: mood and affect appropriate; recent and remote memory " intact          Ortho Exam       Right shoulder-  Incision is well healed.  FF- Active- 155 degrees  Passive- 160 degrees  Strength- 4/5  ER- Active- 45 degrees  Passive 50 degrees  Strength- 4+/5  IR L3  Belly press test strength- 4+/5  Brisk cap refill to all digits, palpable 2+ radial pulse  Positive sensation to light touch palmar, dorsal aspects of small and index fingers and anatomic snuffbox right hand, symmetric to the left.    Imaging:  None today  Assessment:        1. s/p right shoulder arthroscopy with rotator cuff repair complex with bio inductive implant, biceps tenodesis DOS 04/12/2022    2. Adhesive capsulitis of right shoulder    3. Subacromial impingement of right shoulder           Plan:          1. I discussed treatment options at length with patient at today's visit.   2.  She will continue with physical therapy at least once a week to try to work on her continued stretching, particularly with her overhead motion. Her other plans appear to be progressing well. I provided her with instructions for home exercises, continue with wall climbs, pulley, etc.  3. She will return to work with limitations in both her endurance of activity as well as her lifting, but she works as a  and so this will likely be limited anyway except for the repetition of the activity with her positioning.  4. We will give her meloxicam at this time to try to improve residual inflammation and pain. We may consider one more round of prednisone taper if she has any regression.  5. I will follow up with her in 4 weeks for reassessment.      Julia Lujan was in agreement with plan and had all questions answered.     Orders:  No orders of the defined types were placed in this encounter.      Medications:  New Medications Ordered This Visit   Medications   • meloxicam (MOBIC) 15 MG tablet     Sig: Take 1 tablet by mouth Daily.     Dispense:  30 tablet     Refill:  0       Followup:  Return in about 4 weeks (around  8/15/2022).    Diagnoses and all orders for this visit:    1. s/p right shoulder arthroscopy with rotator cuff repair complex with bio inductive implant, biceps tenodesis DOS 04/12/2022 (Primary)    2. Adhesive capsulitis of right shoulder    3. Subacromial impingement of right shoulder    Other orders  -     meloxicam (MOBIC) 15 MG tablet; Take 1 tablet by mouth Daily.  Dispense: 30 tablet; Refill: 0          Dictated utilizing Dragon dictation       Transcribed from ambient dictation for Kale Wilkerson MD by TELMA WAYNE.  07/18/22   15:07 EDT    Patient verbalized consent to the visit recording.  I have personally performed the services described in this document as transcribed by the above individual, and it is both accurate and complete.  Kale Wilkerson MD  7/24/2022  15:15 EDT

## 2022-07-22 ENCOUNTER — HOSPITAL ENCOUNTER (OUTPATIENT)
Dept: PHYSICAL THERAPY | Facility: HOSPITAL | Age: 52
Setting detail: THERAPIES SERIES
Discharge: HOME | End: 2022-07-22

## 2022-07-22 DIAGNOSIS — Z98.890 S/P ROTATOR CUFF SURGERY: Primary | ICD-10-CM

## 2022-07-22 PROCEDURE — 97110 THERAPEUTIC EXERCISES: CPT | Performed by: PHYSICAL THERAPIST

## 2022-07-22 PROCEDURE — 97140 MANUAL THERAPY 1/> REGIONS: CPT | Performed by: PHYSICAL THERAPIST

## 2022-07-22 NOTE — THERAPY TREATMENT NOTE
Outpatient Physical Therapy Ortho Treatment Note  ALEC Staton     Patient Name: Julia Lujan  : 1970  MRN: [id number]  Today's Date: 2022      Visit Date: 2022    Visit Dx:    ICD-10-CM ICD-9-CM   1. S/P rotator cuff surgery  Z98.890 V45.89       Patient Active Problem List   Diagnosis   • Essential (primary) hypertension   • Major depressive disorder, single episode, unspecified   • Screen for colon cancer   • Complex tear of medial meniscus of left knee as current injury   • Complete tear of right rotator cuff   • Subacromial impingement of right shoulder   • Biceps tendinitis of right upper extremity   • s/p right shoulder arthroscopy with rotator cuff repair complex with bio inductive implant, biceps tenodesis DOS 2022        Past Medical History:   Diagnosis Date   • Arthritis     neck and ble   • Arthritis of neck 2021   • Cervicalgia    • CTS (carpal tunnel syndrome)    • Elevated blood pressure reading without diagnosis of hypertension    • Essential (primary) hypertension    • H/O mammogram 2019   • Headache    • Hematuria, unspecified    • Knee swelling    • Major depressive disorder, single episode, unspecified    • Menopausal and female climacteric states    • Nevus, non-neoplastic    • Pain in thoracic spine    • Papanicolaou smear    • Pneumonia, unspecified organism    • Radiculopathy, cervical region    • Rib pain    • Rotator cuff syndrome    • Tear of meniscus of knee    • Unspecified injury of muscle(s) and tendon(s) of the rotator cuff of right shoulder, initial encounter         Past Surgical History:   Procedure Laterality Date   • BREAST IMPLANT SURGERY Bilateral    • COLONOSCOPY W/ POLYPECTOMY N/A 2022    Procedure: COLONOSCOPY WITH POLYPECTOMY;  Surgeon: Clinton Hernandez MD;  Location: [facility];  Service: Gastroenterology;  Laterality: N/A;  sigmoid polyps x 2     • HYSTERECTOMY     • LIPOSUCTION       bilateral thighs   • NECK SURGERY  2021    Les chao   • SHOULDER ARTHROSCOPY W/ ROTATOR CUFF REPAIR Right 04/12/2022    Procedure: SHOULDER ARTHROSCOPY WITH ROTATOR CUFF REPAIR COMPLEX WITH BIOINDUCTIVE IMPLANT, BICEPS TENDONESIS;  Surgeon: Kale Wilkerson MD;  Location: [facility];  Service: Orthopedics;  Laterality: Right;   • SHOULDER SURGERY  April 12,2022        PT Ortho     Row Name 07/22/22 1300       Subjective Comments    Subjective Comments Pt states her shoulder is feeling pretty good. A little sore after returning to work.  -GC          User Key  (r) = Recorded By, (t) = Taken By, (c) = Cosigned By    Initials Name Provider Type    GC Brett Iyer, PT Physical Therapist                             PT Assessment/Plan     Row Name 07/22/22 1300          PT Assessment    Assessment Comments Pt continues to do well with improving ROM, strength, and function.  -GC            PT Plan    PT Plan Comments Pt is to continue her HEP daily. Will re-ck again next week.  -GC           User Key  (r) = Recorded By, (t) = Taken By, (c) = Cosigned By    Initials Name Provider Type    Brett Vogel, PT Physical Therapist                 Modalities     Row Name 07/22/22 1300             Moist Heat    MH Applied Yes  -GC      Location right shoulder - sitting  -GC      PT Moist Heat Minutes 10  -GC      MH Prior to Rx Yes  -GC            User Key  (r) = Recorded By, (t) = Taken By, (c) = Cosigned By    Initials Name Provider Type    Brett Vogel, PT Physical Therapist               OP Exercises     Row Name 07/22/22 1300             Subjective Comments    Subjective Comments Pt states her shoulder is feeling pretty good. A little sore after returning to work.  -GC              Exercise 1    Exercise Name 1 Cane stretch-FLEX  -GC      Cueing 1 Verbal;Demo  -GC      Reps 1 15  -GC      Time 1 5 secs  -GC              Exercise 2    Exercise Name 2 Cane stretch-ABD  -GC      Cueing 2 Verbal;Demo  -GC       Reps 2 15  -GC      Time 2 5 secs  -GC              Exercise 3    Exercise Name 3 Cane stretch-EX  -GC      Cueing 3 Verbal;Demo  -GC      Reps 3 15  -GC      Time 3 5 secs  -GC              Exercise 4    Exercise Name 4 Pulley-FLEX  -GC      Cueing 4 Verbal;Demo  -GC      Time 4 5 min  -GC              Exercise 5    Exercise Name 5 Pulley-Scaption  -GC      Cueing 5 Verbal;Demo  -GC      Time 5 5 min  -GC              Exercise 6    Exercise Name 6 Shoulder ER vs theraband  -GC      Cueing 6 Verbal;Demo  -GC      Reps 6 25  -GC      Time 6 green  -GC              Exercise 7    Exercise Name 7 Shoulder IR vs theraband  -GC      Cueing 7 Verbal;Demo  -GC      Reps 7 25  -GC      Time 7 green  -GC              Exercise 8    Exercise Name 8 Shoulder Rows vs therabamd  -GC      Cueing 8 Verbal;Demo  -GC      Reps 8 25  -GC      Time 8 green  -GC              Exercise 9    Exercise Name 9 Shoulder EXT vs theraband  -GC      Cueing 9 Verbal;Demo  -GC      Reps 9 25  -GC      Time 9 green  -GC              Exercise 10    Exercise Name 10 Scaption Up  -GC      Cueing 10 Verbal;Demo  -GC      Reps 10 25  -GC      Time 10 2#  -GC              Exercise 11    Exercise Name 11 Scaption Down  -GC      Cueing 11 Verbal;Demo  -GC      Reps 11 25  -GC      Time 11 2#  -GC              Exercise 12    Exercise Name 12 sidelying shoulder ER  -GC      Cueing 12 Verbal;Demo  -GC      Time 12 25  -GC              Exercise 13    Exercise Name 13 Prone Hor ABD 90  -GC      Cueing 13 Verbal;Tactile  -GC      Reps 13 25  -GC      Time 13 2#  -GC              Exercise 14    Exercise Name 14 Prone Hor   -GC      Cueing 14 Verbal;Tactile  -GC      Reps 14 25  -GC      Time 14 2#  -GC            User Key  (r) = Recorded By, (t) = Taken By, (c) = Cosigned By    Initials Name Provider Type    Brett Vogel PT Physical Therapist                         Manual Rx (last 36 hours)     Manual Treatments     Row Name 07/22/22 1300              Manual Rx 1    Manual Rx 1 Location right shoulder  -      Manual Rx 1 Type PROM in FLEX-ABD-ER-IR per massive rotator cuff protocol  -      Manual Rx 1 Duration 15 min  -            User Key  (r) = Recorded By, (t) = Taken By, (c) = Cosigned By    Initials Name Provider Type    GC Brett Iyer, PT Physical Therapist                                   Time Calculation:   Start Time: 1300  Stop Time: 1356  Time Calculation (min): 56 min  Untimed Charges  PT Moist Heat Minutes: 10  Total Minutes  Untimed Charges Total Minutes: 10   Total Minutes: 10  Therapy Charges for Today     Code Description Service Date Service Provider Modifiers Qty    [id number]  PT THER PROC EA 15 MIN 7/22/2022 Brett Iyer, PT GP 1    [id number] HC PT MANUAL THERAPY EA 15 MIN 7/22/2022 Brett Iyer, PT GP 1                    Brett Iyer, PT  7/22/2022

## 2022-07-23 RX ORDER — FAMOTIDINE 10 MG/ML
INJECTION, SOLUTION INTRAVENOUS
Status: DISPENSED
Start: 2022-07-23 | End: 2022-07-24

## 2022-07-23 RX ORDER — ONDANSETRON 2 MG/ML
INJECTION INTRAMUSCULAR; INTRAVENOUS
Status: DISPENSED
Start: 2022-07-23 | End: 2022-07-24

## 2022-07-29 ENCOUNTER — HOSPITAL ENCOUNTER (OUTPATIENT)
Dept: PHYSICAL THERAPY | Facility: HOSPITAL | Age: 52
Setting detail: THERAPIES SERIES
Discharge: HOME | End: 2022-07-29

## 2022-07-29 DIAGNOSIS — Z98.890 S/P ROTATOR CUFF SURGERY: Primary | ICD-10-CM

## 2022-07-29 PROCEDURE — 97140 MANUAL THERAPY 1/> REGIONS: CPT | Performed by: PHYSICAL THERAPIST

## 2022-07-29 PROCEDURE — 97110 THERAPEUTIC EXERCISES: CPT | Performed by: PHYSICAL THERAPIST

## 2022-07-29 NOTE — THERAPY TREATMENT NOTE
Outpatient Physical Therapy Ortho Treatment Note  ALEC Staton     Patient Name: Julia Lujan  : 1970  MRN: [id number]  Today's Date: 2022      Visit Date: 2022    Visit Dx:    ICD-10-CM ICD-9-CM   1. S/P rotator cuff surgery  Z98.890 V45.89       Patient Active Problem List   Diagnosis   • Essential (primary) hypertension   • Major depressive disorder, single episode, unspecified   • Screen for colon cancer   • Complex tear of medial meniscus of left knee as current injury   • Complete tear of right rotator cuff   • Subacromial impingement of right shoulder   • Biceps tendinitis of right upper extremity   • s/p right shoulder arthroscopy with rotator cuff repair complex with bio inductive implant, biceps tenodesis DOS 2022        Past Medical History:   Diagnosis Date   • Arthritis     neck and ble   • Arthritis of neck 2021   • Cervicalgia    • CTS (carpal tunnel syndrome)    • Elevated blood pressure reading without diagnosis of hypertension    • Essential (primary) hypertension    • H/O mammogram 2019   • Headache    • Hematuria, unspecified    • Knee swelling    • Major depressive disorder, single episode, unspecified    • Menopausal and female climacteric states    • Nevus, non-neoplastic    • Pain in thoracic spine    • Papanicolaou smear    • Pneumonia, unspecified organism    • Radiculopathy, cervical region    • Rib pain    • Rotator cuff syndrome    • Tear of meniscus of knee    • Unspecified injury of muscle(s) and tendon(s) of the rotator cuff of right shoulder, initial encounter         Past Surgical History:   Procedure Laterality Date   • BREAST IMPLANT SURGERY Bilateral    • COLONOSCOPY W/ POLYPECTOMY N/A 2022    Procedure: COLONOSCOPY WITH POLYPECTOMY;  Surgeon: Clinton Hernandez MD;  Location: [facility];  Service: Gastroenterology;  Laterality: N/A;  sigmoid polyps x 2     • HYSTERECTOMY     • LIPOSUCTION       bilateral thighs   • NECK SURGERY  2021    Les chao   • SHOULDER ARTHROSCOPY W/ ROTATOR CUFF REPAIR Right 04/12/2022    Procedure: SHOULDER ARTHROSCOPY WITH ROTATOR CUFF REPAIR COMPLEX WITH BIOINDUCTIVE IMPLANT, BICEPS TENDONESIS;  Surgeon: Kale Wilkerson MD;  Location: [facility];  Service: Orthopedics;  Laterality: Right;   • SHOULDER SURGERY  April 12,2022                        PT Assessment/Plan     Row Name 07/29/22 1300          PT Assessment    Assessment Comments Pt is doing well with increasing shoulder ROM noted.  -GC            PT Plan    PT Plan Comments Pt is to continue her HEP daily.  -GC           User Key  (r) = Recorded By, (t) = Taken By, (c) = Cosigned By    Initials Name Provider Type    GC Brett Iyer, PT Physical Therapist                 Modalities     Row Name 07/29/22 1300             Subjective Comments    Subjective Comments Pt states work is getting easier.  -GC              Moist Heat    MH Applied Yes  -GC      Location right shoulder - sitting  -GC      PT Moist Heat Minutes 10  -GC      MH Prior to Rx Yes  -GC            User Key  (r) = Recorded By, (t) = Taken By, (c) = Cosigned By    Initials Name Provider Type    GC Brett Iyer, PT Physical Therapist               OP Exercises     Row Name 07/29/22 1300             Subjective Comments    Subjective Comments Pt states work is getting easier.  -GC              Exercise 1    Exercise Name 1 Cane stretch-FLEX  -GC      Cueing 1 Verbal;Demo  -GC      Reps 1 15  -GC      Time 1 5 secs  -GC              Exercise 2    Exercise Name 2 Cane stretch-ABD  -GC      Cueing 2 Verbal;Demo  -GC      Reps 2 15  -GC      Time 2 5 secs  -GC              Exercise 3    Exercise Name 3 Cane stretch-EX  -GC      Cueing 3 Verbal;Demo  -GC      Reps 3 15  -GC      Time 3 5 secs  -GC              Exercise 4    Exercise Name 4 Pulley-FLEX  -GC      Cueing 4 Verbal;Demo  -GC      Time 4 5 min  -GC              Exercise 5    Exercise Name 5  Pulley-Scaption  -GC      Cueing 5 Verbal;Demo  -GC      Time 5 5 min  -GC              Exercise 6    Exercise Name 6 Shoulder ER vs theraband  -GC      Cueing 6 Verbal;Demo  -GC      Reps 6 25  -GC      Time 6 green  -GC              Exercise 7    Exercise Name 7 Shoulder IR vs theraband  -GC      Cueing 7 Verbal;Demo  -GC      Reps 7 25  -GC      Time 7 green  -GC              Exercise 8    Exercise Name 8 Shoulder Rows vs therabamd  -GC      Cueing 8 Verbal;Demo  -GC      Reps 8 25  -GC      Time 8 green  -GC              Exercise 9    Exercise Name 9 Shoulder EXT vs theraband  -GC      Cueing 9 Verbal;Demo  -GC      Reps 9 25  -GC      Time 9 green  -GC              Exercise 10    Exercise Name 10 Scaption Up  -GC      Cueing 10 Verbal;Demo  -GC      Reps 10 25  -GC      Time 10 3#  -GC              Exercise 11    Exercise Name 11 Scaption Down  -GC      Cueing 11 Verbal;Demo  -GC      Reps 11 25  -GC      Time 11 3#  -GC              Exercise 12    Exercise Name 12 sidelying shoulder ER  -GC      Cueing 12 Verbal;Demo  -GC      Reps 12 25  -GC      Time 12 3#  -GC              Exercise 13    Exercise Name 13 Prone Hor ABD 90  -GC      Cueing 13 Verbal;Tactile  -GC      Reps 13 25  -GC      Time 13 3#  -GC              Exercise 14    Exercise Name 14 Prone Hor   -GC      Cueing 14 Verbal;Tactile  -GC      Reps 14 25  -GC      Time 14 3#  -GC            User Key  (r) = Recorded By, (t) = Taken By, (c) = Cosigned By    Initials Name Provider Type     Brett Iyer, PT Physical Therapist                         Manual Rx (last 36 hours)     Manual Treatments     Row Name 07/29/22 1300             Manual Rx 1    Manual Rx 1 Location right shoulder  -GC      Manual Rx 1 Type PROM in FLEX-ABD-ER-IR per massive rotator cuff protocol  -GC      Manual Rx 1 Duration 15 min  -GC            User Key  (r) = Recorded By, (t) = Taken By, (c) = Cosigned By    Initials Name Provider Type    GC Brett Iyer, PT  Physical Therapist                                   Time Calculation:   Start Time: 1300  Stop Time: 1354  Time Calculation (min): 54 min  Untimed Charges  PT Moist Heat Minutes: 10  Total Minutes  Untimed Charges Total Minutes: 10   Total Minutes: 10  Therapy Charges for Today     Code Description Service Date Service Provider Modifiers Qty    [id number]  PT THER PROC EA 15 MIN 7/29/2022 Brett Iyer, PT GP 1    [id number]  PT MANUAL THERAPY EA 15 MIN 7/29/2022 Brett Iyer, PT GP 1                    Brett Iyer, PT  7/29/2022

## 2022-08-01 RX ORDER — VENLAFAXINE HYDROCHLORIDE 150 MG/1
CAPSULE, EXTENDED RELEASE ORAL
Qty: 90 CAPSULE | Refills: 0 | OUTPATIENT
Start: 2022-08-01

## 2022-08-02 RX ORDER — VENLAFAXINE HYDROCHLORIDE 150 MG/1
CAPSULE, EXTENDED RELEASE ORAL
Qty: 90 CAPSULE | Refills: 0 | OUTPATIENT
Start: 2022-08-02

## 2022-08-12 ENCOUNTER — HOSPITAL ENCOUNTER (OUTPATIENT)
Dept: PHYSICAL THERAPY | Facility: HOSPITAL | Age: 52
Setting detail: THERAPIES SERIES
Discharge: HOME | End: 2022-08-12

## 2022-08-12 DIAGNOSIS — Z98.890 S/P ROTATOR CUFF SURGERY: Primary | ICD-10-CM

## 2022-08-12 PROCEDURE — 97140 MANUAL THERAPY 1/> REGIONS: CPT | Performed by: PHYSICAL THERAPIST

## 2022-08-12 PROCEDURE — 97110 THERAPEUTIC EXERCISES: CPT | Performed by: PHYSICAL THERAPIST

## 2022-08-12 NOTE — THERAPY TREATMENT NOTE
Outpatient Physical Therapy Ortho Treatment Note  ALEC Staton     Patient Name: Julia Lujan  : 1970  MRN: [id number]  Today's Date: 2022      Visit Date: 2022    Visit Dx:    ICD-10-CM ICD-9-CM   1. S/P rotator cuff surgery  Z98.890 V45.89       Patient Active Problem List   Diagnosis   • Essential (primary) hypertension   • Major depressive disorder, single episode, unspecified   • Screen for colon cancer   • Complex tear of medial meniscus of left knee as current injury   • Complete tear of right rotator cuff   • Subacromial impingement of right shoulder   • Biceps tendinitis of right upper extremity   • s/p right shoulder arthroscopy with rotator cuff repair complex with bio inductive implant, biceps tenodesis DOS 2022        Past Medical History:   Diagnosis Date   • Arthritis     neck and ble   • Arthritis of neck 2021   • Cervicalgia    • CTS (carpal tunnel syndrome)    • Elevated blood pressure reading without diagnosis of hypertension    • Essential (primary) hypertension    • H/O mammogram 2019   • Headache    • Hematuria, unspecified    • Knee swelling    • Major depressive disorder, single episode, unspecified    • Menopausal and female climacteric states    • Nevus, non-neoplastic    • Pain in thoracic spine    • Papanicolaou smear    • Pneumonia, unspecified organism    • Radiculopathy, cervical region    • Rib pain    • Rotator cuff syndrome    • Tear of meniscus of knee    • Unspecified injury of muscle(s) and tendon(s) of the rotator cuff of right shoulder, initial encounter         Past Surgical History:   Procedure Laterality Date   • BREAST IMPLANT SURGERY Bilateral    • COLONOSCOPY W/ POLYPECTOMY N/A 2022    Procedure: COLONOSCOPY WITH POLYPECTOMY;  Surgeon: Clinton Hernandez MD;  Location: [facility];  Service: Gastroenterology;  Laterality: N/A;  sigmoid polyps x 2     • HYSTERECTOMY     • LIPOSUCTION       bilateral thighs   • NECK SURGERY  2021    Les chao   • SHOULDER ARTHROSCOPY W/ ROTATOR CUFF REPAIR Right 04/12/2022    Procedure: SHOULDER ARTHROSCOPY WITH ROTATOR CUFF REPAIR COMPLEX WITH BIOINDUCTIVE IMPLANT, BICEPS TENDONESIS;  Surgeon: Kale Wilkerson MD;  Location: [facility];  Service: Orthopedics;  Laterality: Right;   • SHOULDER SURGERY  April 12,2022                        PT Assessment/Plan     Row Name 08/12/22 1300          PT Assessment    Assessment Comments Pt is doing well with improving function and ROM. She tolerated her exercise progression well.  -GC            PT Plan    PT Plan Comments Pt is to continue her HEP daily. Will re-ck again next week.  -GC           User Key  (r) = Recorded By, (t) = Taken By, (c) = Cosigned By    Initials Name Provider Type    Brett Vogel, PT Physical Therapist                 Modalities     Row Name 08/12/22 1300             Moist Heat    MH Applied Yes  -GC      Location right shoulder - sitting  -GC      PT Moist Heat Minutes 10  -GC      MH Prior to Rx Yes  -GC            User Key  (r) = Recorded By, (t) = Taken By, (c) = Cosigned By    Initials Name Provider Type    Brett Vogel, PT Physical Therapist               OP Exercises     Row Name 08/12/22 1300             Subjective Comments    Subjective Comments Pt states her shoulder is holding up pretty well.  -GC              Exercise 1    Exercise Name 1 Cane stretch-FLEX  -GC      Cueing 1 Verbal;Demo  -GC      Reps 1 15  -GC      Time 1 5 secs  -GC              Exercise 2    Exercise Name 2 Cane stretch-ABD  -GC      Cueing 2 Verbal;Demo  -GC      Reps 2 15  -GC      Time 2 5 secs  -GC              Exercise 3    Exercise Name 3 Cane stretch-EX  -GC      Cueing 3 Verbal;Demo  -GC      Reps 3 15  -GC      Time 3 5 secs  -GC              Exercise 4    Exercise Name 4 Pulley-FLEX  -GC      Cueing 4 Verbal;Demo  -GC      Time 4 5 min  -GC              Exercise 5    Exercise Name 5  Pulley-Scaption  -GC      Cueing 5 Verbal;Demo  -GC      Time 5 5 min  -GC              Exercise 6    Exercise Name 6 Shoulder ER vs theraband  -GC      Cueing 6 Verbal;Demo  -GC      Reps 6 25  -GC      Time 6 blue  -GC              Exercise 7    Exercise Name 7 Shoulder IR vs theraband  -GC      Cueing 7 Verbal;Demo  -GC      Reps 7 25  -GC      Time 7 blue  -GC              Exercise 8    Exercise Name 8 Shoulder Rows vs therabamd  -GC      Cueing 8 Verbal;Demo  -GC      Reps 8 25  -GC      Time 8 blue  -GC              Exercise 9    Exercise Name 9 Shoulder EXT vs theraband  -GC      Cueing 9 Verbal;Demo  -GC      Reps 9 25  -GC      Time 9 blue  -GC              Exercise 10    Exercise Name 10 Scaption Up  -GC      Cueing 10 Verbal;Demo  -GC      Reps 10 25  -GC      Time 10 3#  -GC              Exercise 11    Exercise Name 11 Scaption Down  -GC      Cueing 11 Verbal;Demo  -GC      Reps 11 25  -GC      Time 11 3#  -GC              Exercise 12    Exercise Name 12 sidelying shoulder ER  -GC      Cueing 12 Verbal;Demo  -GC      Reps 12 25  -GC      Time 12 3#  -GC              Exercise 13    Exercise Name 13 Prone Hor ABD 90  -GC      Cueing 13 Verbal;Tactile  -GC      Reps 13 25  -GC      Time 13 3#  -GC              Exercise 14    Exercise Name 14 Prone Hor   -GC      Cueing 14 Verbal;Tactile  -GC      Reps 14 25  -GC      Time 14 3#  -GC            User Key  (r) = Recorded By, (t) = Taken By, (c) = Cosigned By    Initials Name Provider Type     Brett Iyer, PT Physical Therapist                         Manual Rx (last 36 hours)     Manual Treatments     Row Name 08/12/22 1300             Manual Rx 1    Manual Rx 1 Location right shoulder  -GC      Manual Rx 1 Type PROM in FLEX-ABD-ER-IR per massive rotator cuff protocol  -GC      Manual Rx 1 Duration 15 min  -GC            User Key  (r) = Recorded By, (t) = Taken By, (c) = Cosigned By    Initials Name Provider Type    GC Brett Iyer, PT Physical  Therapist                                   Time Calculation:   Start Time: 1300  Stop Time: 1352  Time Calculation (min): 52 min  Untimed Charges  PT Moist Heat Minutes: 10  Total Minutes  Untimed Charges Total Minutes: 10   Total Minutes: 10  Therapy Charges for Today     Code Description Service Date Service Provider Modifiers Qty    [id number]  PT THER PROC EA 15 MIN 8/12/2022 Brett Iyer, PT GP 1    [id number]  PT MANUAL THERAPY EA 15 MIN 8/12/2022 Brett Iyer, PT GP 1                    Brett Iyer PT  8/12/2022

## 2022-08-14 RX ORDER — LISINOPRIL AND HYDROCHLOROTHIAZIDE 20; 12.5 MG/1; MG/1
TABLET ORAL
Qty: 45 TABLET | Refills: 1 | OUTPATIENT
Start: 2022-08-14

## 2022-08-19 ENCOUNTER — HOSPITAL ENCOUNTER (OUTPATIENT)
Dept: PHYSICAL THERAPY | Facility: HOSPITAL | Age: 52
Setting detail: THERAPIES SERIES
Discharge: HOME | End: 2022-08-19

## 2022-08-19 DIAGNOSIS — Z98.890 S/P ROTATOR CUFF SURGERY: Primary | ICD-10-CM

## 2022-08-19 PROCEDURE — 97140 MANUAL THERAPY 1/> REGIONS: CPT | Performed by: PHYSICAL THERAPIST

## 2022-08-19 PROCEDURE — 97110 THERAPEUTIC EXERCISES: CPT | Performed by: PHYSICAL THERAPIST

## 2022-08-19 NOTE — THERAPY RE-EVALUATION
Outpatient Physical Therapy Ortho Re-Evaluation  ALEC Staton     Patient Name: Julia Lujan  : 1970  MRN: [id number]  Today's Date: 2022      Visit Date: 2022    Patient Active Problem List   Diagnosis   • Essential (primary) hypertension   • Major depressive disorder, single episode, unspecified   • Screen for colon cancer   • Complex tear of medial meniscus of left knee as current injury   • Complete tear of right rotator cuff   • Subacromial impingement of right shoulder   • Biceps tendinitis of right upper extremity   • s/p right shoulder arthroscopy with rotator cuff repair complex with bio inductive implant, biceps tenodesis DOS 2022        Past Medical History:   Diagnosis Date   • Arthritis     neck and ble   • Arthritis of neck 2021   • Cervicalgia    • CTS (carpal tunnel syndrome)    • Elevated blood pressure reading without diagnosis of hypertension    • Essential (primary) hypertension    • H/O mammogram 2019   • Headache    • Hematuria, unspecified    • Knee swelling    • Major depressive disorder, single episode, unspecified    • Menopausal and female climacteric states    • Nevus, non-neoplastic    • Pain in thoracic spine    • Papanicolaou smear    • Pneumonia, unspecified organism    • Radiculopathy, cervical region    • Rib pain    • Rotator cuff syndrome    • Tear of meniscus of knee    • Unspecified injury of muscle(s) and tendon(s) of the rotator cuff of right shoulder, initial encounter         Past Surgical History:   Procedure Laterality Date   • BREAST IMPLANT SURGERY Bilateral    • COLONOSCOPY W/ POLYPECTOMY N/A 2022    Procedure: COLONOSCOPY WITH POLYPECTOMY;  Surgeon: Clinton Hernandez MD;  Location: [facility];  Service: Gastroenterology;  Laterality: N/A;  sigmoid polyps x 2     • HYSTERECTOMY     • LIPOSUCTION      bilateral thighs   • NECK SURGERY      Les chao   • SHOULDER ARTHROSCOPY  W/ ROTATOR CUFF REPAIR Right 04/12/2022    Procedure: SHOULDER ARTHROSCOPY WITH ROTATOR CUFF REPAIR COMPLEX WITH BIOINDUCTIVE IMPLANT, BICEPS TENDONESIS;  Surgeon: Kale Wilkerson MD;  Location: [facility];  Service: Orthopedics;  Laterality: Right;   • SHOULDER SURGERY  April 12,2022       Visit Dx:     ICD-10-CM ICD-9-CM   1. S/P rotator cuff surgery  Z98.890 V45.89              PT Ortho     Row Name 08/19/22 1340       Right Upper Ext    Rt Shoulder Abduction AROM 147 degrees  -GC    Rt Shoulder Flexion AROM 155 degrees  -GC    Rt Shoulder External Rotation AROM 67 degrees  -GC    Rt Shoulder Internal Rotation AROM 65 degrees  -GC       MMT Right Upper Ext    Rt Shoulder Flexion MMT, Gross Movement (4/5) good  -GC    Rt Shoulder Extension MMT, Gross Movement (5/5) normal  -GC    Rt Shoulder ABduction MMT, Gross Movement (4/5) good  -GC    Rt Shoulder ADduction MMT, Gross Movement (5/5) normal  -GC    Rt Shoulder Internal Rotation MMT, Gross Movement (4+/5) good plus  -GC    Rt Shoulder External Rotation MMT, Gross Movement (4/5) good  -GC          User Key  (r) = Recorded By, (t) = Taken By, (c) = Cosigned By    Initials Name Provider Type    Brett Vogel, PT Physical Therapist                                   PT OP Goals     Row Name 08/19/22 1340          PT Short Term Goals    STG Date to Achieve 05/31/22  -     STG 1 Decrease right shoulder pain to 1-2/10 with activity.  -     STG 1 Progress Met  -     STG 2 Increase PROM of right shoulder to 145 degrees FLEX and ABD and 75 degrees ER and IR with testing.  -     STG 2 Progress Met  -     STG 3 Pt will be independent with her HEP issued by this therapist.  -     STG 3 Progress Met  -            Long Term Goals    LTG Date to Achieve 06/28/22  -     LTG 1 Decrease right shoulder pain to 0-1/10 with activity.  -     LTG 1 Progress Partially Met;Ongoing;Progressing  -     LTG 2 Increase AROM of right shoulder to 175 degrees FLEX and  ABD and 85 degrees ER and IR with testing.  -GC     LTG 2 Progress Ongoing;Progressing  -GC     LTG 3 Increase right shoulder girdle strength to at least 4+/5 all planes with testing.  -GC     LTG 3 Progress Partially Met;Ongoing;Progressing  -GC     LTG 4 Pt will be independent with all ADLs and have a Quick Dash score < 12.  -GC     LTG 4 Progress Partially Met;Ongoing;Progressing  -GC           User Key  (r) = Recorded By, (t) = Taken By, (c) = Cosigned By    Initials Name Provider Type    Brett Vogel, PT Physical Therapist                 PT Assessment/Plan     Row Name 08/19/22 1340          PT Assessment    Assessment Comments Pt shows increased shoulder range and strength. She is progressing nicely towards her goals.  -GC            PT Plan    PT Plan Comments Pt is to continue her HEP daily. She has MD follow up 8/22. Will continue as advised.  -GC           User Key  (r) = Recorded By, (t) = Taken By, (c) = Cosigned By    Initials Name Provider Type    Brett Vogel PT Physical Therapist                 Modalities     Row Name 08/19/22 1340             Subjective Comments    Subjective Comments Pt states her shoulder is holding up pretty well. 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  -GC              Moist Heat    MH Applied Yes  -GC      Location right shoulder - sitting  -GC      PT Moist Heat Minutes 10  -GC      MH Prior to Rx Yes  -GC            User Key  (r) = Recorded By, (t) = Taken By, (c) = Cosigned By    Initials Name Provider Type    Brett Vogel PT Physical Therapist               OP Exercises     Row Name 08/19/22 1340             Subjective Comments    Subjective Comments Pt states her shoulder is holding up pretty well. 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  -GC              Exercise 1    Exercise Name 1 Cane stretch-FLEX  -GC      Cueing 1 Verbal;Demo  -GC      Reps 1 15  -GC      Time 1 5 secs  -GC              Exercise 2    Exercise Name 2 Cane stretch-ABD  -GC      Cueing 2 Verbal;Demo  -GC      Reps 2 15  -GC      Time 2 5 secs  -GC              Exercise 3    Exercise Name 3 Cane stretch-EX  -GC      Cueing 3 Verbal;Demo  -GC      Reps 3 15  -GC      Time 3 5 secs  -GC              Exercise 4    Exercise Name 4 Pulley-FLEX  -GC      Cueing 4 Verbal;Demo  -GC      Time 4 5 min  -GC              Exercise 5    Exercise Name 5 Pulley-Scaption  -GC      Cueing 5 Verbal;Demo  -GC      Time 5 5 min  -GC              Exercise 6    Exercise Name 6 Shoulder ER vs theraband  -GC      Cueing 6 Verbal;Demo  -GC      Reps 6 25  -GC      Time 6 blue  -GC              Exercise 7    Exercise Name 7 Shoulder IR vs theraband  -GC      Cueing 7 Verbal;Demo  -GC      Reps 7 25  -GC      Time 7 blue  -GC              Exercise 8    Exercise Name 8 Shoulder Rows vs therabamd  -GC      Cueing 8 Verbal;Demo  -GC      Reps 8 25  -GC      Time 8 blue  -GC              Exercise 9    Exercise Name 9 Shoulder EXT vs theraband  -GC      Cueing 9 Verbal;Demo  -GC      Reps 9 25  -GC      Time 9 blue  -GC              Exercise 10    Exercise Name 10 Scaption Up  -GC      Cueing 10 Verbal;Demo  -GC      Reps 10 25  -GC      Time 10 3#  -GC              Exercise 11    Exercise Name 11 Scaption Down  -GC      Cueing 11 Verbal;Demo  -GC      Reps 11 25  -GC      Time 11 3#  -GC              Exercise 12    Exercise Name 12 sidelying shoulder ER  -GC      Cueing 12 Verbal;Demo  -GC      Reps 12 25  -GC      Time 12 3#  -GC              Exercise 13    Exercise Name 13 Prone Hor ABD 90  -GC      Cueing 13 Verbal;Tactile  -GC      Reps 13 25  -GC      Time 13 3#  -GC              Exercise 14    Exercise Name 14 Prone Hor   -GC      Cueing 14 Verbal;Tactile  -GC      Reps  14 25  -GC      Time 14 3#  -            User Key  (r) = Recorded By, (t) = Taken By, (c) = Cosigned By    Initials Name Provider Type     Brett Iyer, PT Physical Therapist              Manual Rx (last 36 hours)     Manual Treatments     Row Name 08/19/22 1340             Manual Rx 1    Manual Rx 1 Location right shoulder  -      Manual Rx 1 Type PROM in FLEX-ABD-ER-IR per massive rotator cuff protocol  -GC      Manual Rx 1 Duration 15 min  -            User Key  (r) = Recorded By, (t) = Taken By, (c) = Cosigned By    Initials Name Provider Type     Brett Iyer, PT Physical Therapist                                      Time Calculation:     Start Time: 1340  Stop Time: 1439  Time Calculation (min): 59 min  Untimed Charges  PT Moist Heat Minutes: 10  Total Minutes  Untimed Charges Total Minutes: 10   Total Minutes: 10     Therapy Charges for Today     Code Description Service Date Service Provider Modifiers Qty    [id number]  PT THER PROC EA 15 MIN 8/19/2022 Brett Ieyr, PT GP 1    [id number]  PT MANUAL THERAPY EA 15 MIN 8/19/2022 Brett Iyer, PT GP 1                    Brett Iyer PT  8/19/2022

## 2022-08-22 ENCOUNTER — OFFICE VISIT (OUTPATIENT)
Dept: ORTHOPEDIC SURGERY | Facility: CLINIC | Age: 52
End: 2022-08-22

## 2022-08-22 VITALS — BODY MASS INDEX: 27.66 KG/M2 | WEIGHT: 162 LBS | HEIGHT: 64 IN

## 2022-08-22 DIAGNOSIS — M75.01 ADHESIVE CAPSULITIS OF RIGHT SHOULDER: ICD-10-CM

## 2022-08-22 DIAGNOSIS — Z98.890 STATUS POST ARTHROSCOPY OF SHOULDER: Primary | ICD-10-CM

## 2022-08-22 PROCEDURE — 99212 OFFICE O/P EST SF 10 MIN: CPT | Performed by: ORTHOPAEDIC SURGERY

## 2022-08-22 NOTE — PROGRESS NOTES
Subjective:     Patient ID: Julia Lujan is a 52 y.o. female.    Chief Complaint:  F/u s/p right shoulder rotator cuff repair and biceps tenodesis  DOS 4/12/2022  Plan last visit-physical therapy, meloxicam    History of Present Illness  Julia Lujan returns to clinic today for evaluation of evaluation of status post right shoulder rotator cuff repair. The patient is doing well overall, and notes improvement in her symptoms. She reports mild stiffness, but this has been helpful with physical therapy on the manipulation side of things with the therapist working her motion, particularly overhead and external rotation. She is having some occasional pain when she does resisted activities, but it is significantly improving both in her ability to be able to do resisted activities as well as the amount of pain afterwards is minimizing. The patient denies any numbness or tingling.     Social History     Occupational History   • Occupation:    Tobacco Use   • Smoking status: Former Smoker     Packs/day: 0.50     Years: 20.00     Pack years: 10.00     Types: Electronic Cigarette, Cigarettes, Electronic Cigarette     Start date: 1/20/1999     Quit date: 4/1/2019     Years since quitting: 3.3   • Smokeless tobacco: Never Used   • Tobacco comment: QUIT 2019 STATED AGE 13   Vaping Use   • Vaping Use: Never used   Substance and Sexual Activity   • Alcohol use: Yes     Alcohol/week: 10.0 standard drinks     Types: 10 Shots of liquor per week     Comment: WINE 5X A WEEK   • Drug use: Yes     Types: Marijuana     Comment: ROUTINELY   • Sexual activity: Yes     Partners: Male     Birth control/protection: Surgical      Past Medical History:   Diagnosis Date   • Arthritis     neck and ble   • Arthritis of neck Jan 2021   • Cervicalgia    • CTS (carpal tunnel syndrome) 2021   • Elevated blood pressure reading without diagnosis of hypertension    • Essential (primary) hypertension    • H/O mammogram 01/2019   •  "Headache    • Hematuria, unspecified    • Knee swelling 2022   • Major depressive disorder, single episode, unspecified    • Menopausal and female climacteric states    • Nevus, non-neoplastic    • Pain in thoracic spine    • Papanicolaou smear 2017   • Pneumonia, unspecified organism    • Radiculopathy, cervical region    • Rib pain    • Rotator cuff syndrome March , 2022   • Tear of meniscus of knee 2022   • Unspecified injury of muscle(s) and tendon(s) of the rotator cuff of right shoulder, initial encounter      Past Surgical History:   Procedure Laterality Date   • BREAST IMPLANT SURGERY Bilateral 2000   • COLONOSCOPY W/ POLYPECTOMY N/A 03/11/2022    Procedure: COLONOSCOPY WITH POLYPECTOMY;  Surgeon: Clinton Hernandez MD;  Location:  [facility];  Service: Gastroenterology;  Laterality: N/A;  sigmoid polyps x 2     • HYSTERECTOMY     • LIPOSUCTION      bilateral thighs   • NECK SURGERY  2021    Les chao   • SHOULDER ARTHROSCOPY W/ ROTATOR CUFF REPAIR Right 04/12/2022    Procedure: SHOULDER ARTHROSCOPY WITH ROTATOR CUFF REPAIR COMPLEX WITH BIOINDUCTIVE IMPLANT, BICEPS TENDONESIS;  Surgeon: Kale Wilkerson MD;  Location:  [facility];  Service: Orthopedics;  Laterality: Right;   • SHOULDER SURGERY  April 12,2022       Family History   Problem Relation Age of Onset   • Lymphoma Mother    • Cancer Mother         Passed away 2019   • Other Father         BLADDER CANCER   • Cancer Father         Recovered   • Scoliosis Father    • Breast cancer Maternal Grandmother    • Cancer Maternal Grandmother         Passed away 2019   • Other Maternal Uncle         BRACA GENE+   • Cancer Maternal Uncle         Passed away 2020         Review of Systems        Objective:  Vitals:    08/22/22 1507   Weight: 73.5 kg (162 lb)   Height: 162.6 cm (64\")         08/22/22  1507   Weight: 73.5 kg (162 lb)     Body mass index is 27.81 kg/m².  General: No acute distress.  Resp: normal respiratory effort  Skin: no rashes or " wounds; normal turgor  Psych: mood and affect appropriate; recent and remote memory intact      Right Shoulder Exam     Range of Motion   Right shoulder external rotation:  ER- Active- 50 degrees Passive 60 degrees Strength- 4+/5.   Right shoulder forward flexion: Active- 160 degrees, Passive- 170 degrees Strength- 4/5.     Muscle Strength   Internal rotation: 5/5     Other   Right shoulder sensation: Positive sensation to light touch palmar, dorsal aspects of small and index fingers and anatomic snuffbox right hand symmetric to the left.  Right shoulder pulse absent: Brisk cap refill to all digits, palpable 2+ radial pulse.    Comments:  Incisions are well healed.  Positive deltoid fibroid in all three components.  Bear hug sign- Negative                 Imaging:  None today  Assessment:        1. s/p right shoulder arthroscopy with rotator cuff repair complex with bio inductive implant, biceps tenodesis DOS 04/12/2022    2. Adhesive capsulitis of right shoulder           Plan:          1. Discussed treatment options at length with patient at today's visit. The patient is making progress. She does still have some mild stiffness, but it is progressing well with her stretching program as well as with working with physical therapy. Her strength is progressing. She still does have some deficits particularly with her forward flexion, which is not surprising given her stiffness as well as the large nature of her cuff tear.  2. I will plan to see her back in 3 months for repeat evaluation of motion, strength or call me sooner if she has any recurrent issues in regards to swelling, stiffness, weakness or pain.     Julia Lujan was in agreement with plan and had all questions answered.     Orders:  No orders of the defined types were placed in this encounter.      Medications:  No orders of the defined types were placed in this encounter.      Followup:  Return in about 3 months (around 11/22/2022).    Diagnoses and all  orders for this visit:    1. s/p right shoulder arthroscopy with rotator cuff repair complex with bio inductive implant, biceps tenodesis DOS 04/12/2022 (Primary)    2. Adhesive capsulitis of right shoulder          Dictated utilizing Dragon dictation     Transcribed from ambient dictation for Kale Wilkerson MD by Angélica Larson.  08/22/22   17:40 EDT    Patient verbalized consent to the visit recording.  I have personally performed the services described in this document as transcribed by the above individual, and it is both accurate and complete.  Kale Wilkerson MD  8/24/2022  22:18 EDT

## 2022-08-26 ENCOUNTER — HOSPITAL ENCOUNTER (OUTPATIENT)
Dept: PHYSICAL THERAPY | Facility: HOSPITAL | Age: 52
Setting detail: THERAPIES SERIES
Discharge: HOME | End: 2022-08-26

## 2022-08-26 DIAGNOSIS — Z98.890 S/P ROTATOR CUFF SURGERY: Primary | ICD-10-CM

## 2022-08-26 PROCEDURE — 97140 MANUAL THERAPY 1/> REGIONS: CPT | Performed by: PHYSICAL THERAPIST

## 2022-08-26 PROCEDURE — 97110 THERAPEUTIC EXERCISES: CPT | Performed by: PHYSICAL THERAPIST

## 2022-08-26 NOTE — THERAPY TREATMENT NOTE
Outpatient Physical Therapy Ortho Treatment Note  [facility]     Patient Name: Julia Lujan  : 1970  MRN: [id number]  Today's Date: 2022      Visit Date: 2022    Visit Dx:    ICD-10-CM ICD-9-CM   1. S/P rotator cuff surgery  Z98.890 V45.89       Patient Active Problem List   Diagnosis   • Essential (primary) hypertension   • Major depressive disorder, single episode, unspecified   • Screen for colon cancer   • Complex tear of medial meniscus of left knee as current injury   • Complete tear of right rotator cuff   • Subacromial impingement of right shoulder   • Biceps tendinitis of right upper extremity   • s/p right shoulder arthroscopy with rotator cuff repair complex with bio inductive implant, biceps tenodesis DOS 2022        Past Medical History:   Diagnosis Date   • Arthritis     neck and ble   • Arthritis of neck 2021   • Cervicalgia    • CTS (carpal tunnel syndrome)    • Elevated blood pressure reading without diagnosis of hypertension    • Essential (primary) hypertension    • H/O mammogram 2019   • Headache    • Hematuria, unspecified    • Knee swelling    • Major depressive disorder, single episode, unspecified    • Menopausal and female climacteric states    • Nevus, non-neoplastic    • Pain in thoracic spine    • Papanicolaou smear    • Pneumonia, unspecified organism    • Radiculopathy, cervical region    • Rib pain    • Rotator cuff syndrome    • Tear of meniscus of knee    • Unspecified injury of muscle(s) and tendon(s) of the rotator cuff of right shoulder, initial encounter         Past Surgical History:   Procedure Laterality Date   • BREAST IMPLANT SURGERY Bilateral    • COLONOSCOPY W/ POLYPECTOMY N/A 2022    Procedure: COLONOSCOPY WITH POLYPECTOMY;  Surgeon: Clinton Hernandez MD;  Location: [facility];  Service: Gastroenterology;  Laterality: N/A;  sigmoid polyps x 2     • HYSTERECTOMY     • LIPOSUCTION       bilateral thighs   • NECK SURGERY  2021    Les chao   • SHOULDER ARTHROSCOPY W/ ROTATOR CUFF REPAIR Right 04/12/2022    Procedure: SHOULDER ARTHROSCOPY WITH ROTATOR CUFF REPAIR COMPLEX WITH BIOINDUCTIVE IMPLANT, BICEPS TENDONESIS;  Surgeon: Kale Wilkerson MD;  Location: [facility];  Service: Orthopedics;  Laterality: Right;   • SHOULDER SURGERY  April 12,2022                        PT Assessment/Plan     Row Name 08/26/22 1130          PT Assessment    Assessment Comments Pt is doing well with improving shoulder range and function.  -GC            PT Plan    PT Plan Comments Pt is to continue her HEP daily.  -GC           User Key  (r) = Recorded By, (t) = Taken By, (c) = Cosigned By    Initials Name Provider Type    GC Brett Iyer, PT Physical Therapist                 Modalities     Row Name 08/26/22 1130             Moist Heat    MH Applied Yes  -GC      Location right shoulder - sitting  -GC      PT Moist Heat Minutes 10  -GC      MH Prior to Rx Yes  -GC            User Key  (r) = Recorded By, (t) = Taken By, (c) = Cosigned By    Initials Name Provider Type    GC Brett Iyer, PT Physical Therapist               OP Exercises     Row Name 08/26/22 1130             Subjective Comments    Subjective Comments Pt states her biceps has been a little sore with reaching out.  -GC              Exercise 1    Exercise Name 1 Cane stretch-FLEX  -GC      Cueing 1 Verbal;Demo  -GC      Reps 1 15  -GC      Time 1 5 secs  -GC              Exercise 2    Exercise Name 2 Cane stretch-ABD  -GC      Cueing 2 Verbal;Demo  -GC      Reps 2 15  -GC      Time 2 5 secs  -GC              Exercise 3    Exercise Name 3 Cane stretch-EX  -GC      Cueing 3 Verbal;Demo  -GC      Reps 3 15  -GC      Time 3 5 secs  -GC              Exercise 4    Exercise Name 4 Pulley-FLEX  -GC      Cueing 4 Verbal;Demo  -GC      Time 4 5 min  -GC              Exercise 5    Exercise Name 5 Pulley-Scaption  -GC      Cueing 5 Verbal;Demo  -GC       Time 5 5 min  -GC              Exercise 6    Exercise Name 6 Shoulder ER vs theraband  -GC      Cueing 6 Verbal;Demo  -GC      Reps 6 25  -GC      Time 6 blue  -GC              Exercise 7    Exercise Name 7 Shoulder IR vs theraband  -GC      Cueing 7 Verbal;Demo  -GC      Reps 7 25  -GC      Time 7 blue  -GC              Exercise 8    Exercise Name 8 Shoulder Rows vs therabamd  -GC      Cueing 8 Verbal;Demo  -GC      Reps 8 25  -GC      Time 8 blue  -GC              Exercise 9    Exercise Name 9 Shoulder EXT vs theraband  -GC      Cueing 9 Verbal;Demo  -GC      Reps 9 25  -GC      Time 9 blue  -GC              Exercise 10    Exercise Name 10 Scaption Up  -GC      Cueing 10 Verbal;Demo  -GC      Reps 10 25  -GC      Time 10 3#  -GC              Exercise 11    Exercise Name 11 Scaption Down  -GC      Cueing 11 Verbal;Demo  -GC      Reps 11 25  -GC      Time 11 3#  -GC              Exercise 12    Exercise Name 12 sidelying shoulder ER  -GC      Cueing 12 Verbal;Demo  -GC      Reps 12 25  -GC      Time 12 3#  -GC              Exercise 13    Exercise Name 13 Prone Hor ABD 90  -GC      Cueing 13 Verbal;Tactile  -GC      Reps 13 25  -GC      Time 13 3#  -GC              Exercise 14    Exercise Name 14 Prone Hor   -GC      Cueing 14 Verbal;Tactile  -GC      Reps 14 25  -GC      Time 14 3#  -GC            User Key  (r) = Recorded By, (t) = Taken By, (c) = Cosigned By    Initials Name Provider Type    GC Brett Iyer, PT Physical Therapist                         Manual Rx (last 36 hours)     Manual Treatments     Row Name 08/26/22 1130             Manual Rx 1    Manual Rx 1 Location right shoulder  -GC      Manual Rx 1 Type PROM in FLEX-ABD-ER-IR per massive rotator cuff protocol  -GC      Manual Rx 1 Duration 15 min  -GC            User Key  (r) = Recorded By, (t) = Taken By, (c) = Cosigned By    Initials Name Provider Type    GC Costkendal, Brett, PT Physical Therapist                                   Time  Calculation:   Start Time: 1130  Stop Time: 1221  Time Calculation (min): 51 min  Untimed Charges  PT Moist Heat Minutes: 10  Total Minutes  Untimed Charges Total Minutes: 10   Total Minutes: 10  Therapy Charges for Today     Code Description Service Date Service Provider Modifiers Qty    [id number]  PT THER PROC EA 15 MIN 8/26/2022 Brett Iyer, PT GP 1    [id number] HC PT MANUAL THERAPY EA 15 MIN 8/26/2022 Brett Iyer, PT GP 1                    Brett Ieyr, PT  8/26/2022

## 2022-09-02 ENCOUNTER — HOSPITAL ENCOUNTER (OUTPATIENT)
Dept: PHYSICAL THERAPY | Facility: HOSPITAL | Age: 52
Setting detail: THERAPIES SERIES
Discharge: HOME | End: 2022-09-02

## 2022-09-02 DIAGNOSIS — Z98.890 S/P ROTATOR CUFF SURGERY: Primary | ICD-10-CM

## 2022-09-02 PROCEDURE — 97140 MANUAL THERAPY 1/> REGIONS: CPT | Performed by: PHYSICAL THERAPIST

## 2022-09-02 PROCEDURE — 97110 THERAPEUTIC EXERCISES: CPT | Performed by: PHYSICAL THERAPIST

## 2022-09-02 NOTE — THERAPY TREATMENT NOTE
Outpatient Physical Therapy Ortho Treatment Note  [facility]     Patient Name: Julia Lujan  : 1970  MRN: [id number]  Today's Date: 2022      Visit Date: 2022    Visit Dx:    ICD-10-CM ICD-9-CM   1. S/P rotator cuff surgery  Z98.890 V45.89       Patient Active Problem List   Diagnosis   • Essential (primary) hypertension   • Major depressive disorder, single episode, unspecified   • Screen for colon cancer   • Complex tear of medial meniscus of left knee as current injury   • Complete tear of right rotator cuff   • Subacromial impingement of right shoulder   • Biceps tendinitis of right upper extremity   • s/p right shoulder arthroscopy with rotator cuff repair complex with bio inductive implant, biceps tenodesis DOS 2022        Past Medical History:   Diagnosis Date   • Arthritis     neck and ble   • Arthritis of neck 2021   • Cervicalgia    • CTS (carpal tunnel syndrome)    • Elevated blood pressure reading without diagnosis of hypertension    • Essential (primary) hypertension    • H/O mammogram 2019   • Headache    • Hematuria, unspecified    • Knee swelling    • Major depressive disorder, single episode, unspecified    • Menopausal and female climacteric states    • Nevus, non-neoplastic    • Pain in thoracic spine    • Papanicolaou smear    • Pneumonia, unspecified organism    • Radiculopathy, cervical region    • Rib pain    • Rotator cuff syndrome    • Tear of meniscus of knee    • Unspecified injury of muscle(s) and tendon(s) of the rotator cuff of right shoulder, initial encounter         Past Surgical History:   Procedure Laterality Date   • BREAST IMPLANT SURGERY Bilateral    • COLONOSCOPY W/ POLYPECTOMY N/A 2022    Procedure: COLONOSCOPY WITH POLYPECTOMY;  Surgeon: Clinton Hernandez MD;  Location: [facility];  Service: Gastroenterology;  Laterality: N/A;  sigmoid polyps x 2     • HYSTERECTOMY     • LIPOSUCTION       bilateral thighs   • NECK SURGERY  2021    Les chao   • SHOULDER ARTHROSCOPY W/ ROTATOR CUFF REPAIR Right 04/12/2022    Procedure: SHOULDER ARTHROSCOPY WITH ROTATOR CUFF REPAIR COMPLEX WITH BIOINDUCTIVE IMPLANT, BICEPS TENDONESIS;  Surgeon: Kale Wilkerson MD;  Location: [facility];  Service: Orthopedics;  Laterality: Right;   • SHOULDER SURGERY  April 12,2022                        PT Assessment/Plan     Row Name 09/02/22 1200          PT Assessment    Assessment Comments Pt is doing well with decreasing pain and imrpoving function.  -GC            PT Plan    PT Plan Comments Pt is to continue her HEP daily.  -GC           User Key  (r) = Recorded By, (t) = Taken By, (c) = Cosigned By    Initials Name Provider Type    GC Brett Iyer, PT Physical Therapist                 Modalities     Row Name 09/02/22 1200             Subjective Comments    Subjective Comments Pt says her shoulder is feeling pretty good.  -GC              Moist Heat    MH Applied Yes  -GC      Location right shoulder - sitting  -GC      PT Moist Heat Minutes 10  -GC      MH Prior to Rx Yes  -GC            User Key  (r) = Recorded By, (t) = Taken By, (c) = Cosigned By    Initials Name Provider Type    GC Brett Iyer, PT Physical Therapist               OP Exercises     Row Name 09/02/22 1200             Subjective Comments    Subjective Comments Pt says her shoulder is feeling pretty good.  -GC              Exercise 1    Exercise Name 1 Cane stretch-FLEX  -GC      Cueing 1 Verbal;Demo  -GC      Reps 1 15  -GC      Time 1 5 secs  -GC              Exercise 2    Exercise Name 2 Cane stretch-ABD  -GC      Cueing 2 Verbal;Demo  -GC      Reps 2 15  -GC      Time 2 5 secs  -GC              Exercise 3    Exercise Name 3 Cane stretch-EX  -GC      Cueing 3 Verbal;Demo  -GC      Reps 3 15  -GC      Time 3 5 secs  -GC              Exercise 4    Exercise Name 4 Pulley-FLEX  -GC      Cueing 4 Verbal;Demo  -GC      Time 4 5 min  -GC               Exercise 5    Exercise Name 5 Pulley-Scaption  -GC      Cueing 5 Verbal;Demo  -GC      Time 5 5 min  -GC              Exercise 6    Exercise Name 6 Shoulder ER vs theraband  -GC      Cueing 6 Verbal;Demo  -GC      Reps 6 25  -GC      Time 6 blue  -GC              Exercise 7    Exercise Name 7 Shoulder IR vs theraband  -GC      Cueing 7 Verbal;Demo  -GC      Reps 7 25  -GC      Time 7 blue  -GC              Exercise 8    Exercise Name 8 Shoulder Rows vs therabamd  -GC      Cueing 8 Verbal;Demo  -GC      Reps 8 25  -GC      Time 8 blue  -GC              Exercise 9    Exercise Name 9 Shoulder EXT vs theraband  -GC      Cueing 9 Verbal;Demo  -GC      Reps 9 25  -GC      Time 9 blue  -GC              Exercise 10    Exercise Name 10 Scaption Up  -GC      Cueing 10 Verbal;Demo  -GC      Reps 10 25  -GC      Time 10 3#  -GC              Exercise 11    Exercise Name 11 Scaption Down  -GC      Cueing 11 Verbal;Demo  -GC      Reps 11 25  -GC      Time 11 3#  -GC              Exercise 12    Exercise Name 12 sidelying shoulder ER  -GC      Cueing 12 Verbal;Demo  -GC      Reps 12 25  -GC      Time 12 3#  -GC              Exercise 13    Exercise Name 13 Prone Hor ABD 90  -GC      Cueing 13 Verbal;Tactile  -GC      Reps 13 25  -GC      Time 13 3#  -GC              Exercise 14    Exercise Name 14 Prone Hor   -GC      Cueing 14 Verbal;Tactile  -GC      Reps 14 25  -GC      Time 14 3#  -GC            User Key  (r) = Recorded By, (t) = Taken By, (c) = Cosigned By    Initials Name Provider Type    GC Brett Iyer, PT Physical Therapist                         Manual Rx (last 36 hours)     Manual Treatments     Row Name 09/02/22 1200             Manual Rx 1    Manual Rx 1 Location right shoulder  -GC      Manual Rx 1 Type PROM in FLEX-ABD-ER-IR per massive rotator cuff protocol  -GC      Manual Rx 1 Duration 15 min  -GC            User Key  (r) = Recorded By, (t) = Taken By, (c) = Cosigned By    Initials Name Provider Type    GC  Brett Iyer, PT Physical Therapist                                   Time Calculation:   Start Time: 1200  Stop Time: 1253  Time Calculation (min): 53 min  Untimed Charges  PT Moist Heat Minutes: 10  Total Minutes  Untimed Charges Total Minutes: 10   Total Minutes: 10  Therapy Charges for Today     Code Description Service Date Service Provider Modifiers Qty    [id number]  PT THER PROC EA 15 MIN 9/2/2022 Brett Iyer, PT GP 1    [id number]  PT MANUAL THERAPY EA 15 MIN 9/2/2022 Brett Iyer, PT GP 1                    Brett Iyer PT  9/2/2022

## 2022-09-09 ENCOUNTER — HOSPITAL ENCOUNTER (OUTPATIENT)
Dept: PHYSICAL THERAPY | Facility: HOSPITAL | Age: 52
Setting detail: THERAPIES SERIES
Discharge: HOME | End: 2022-09-09

## 2022-09-09 DIAGNOSIS — Z98.890 S/P ROTATOR CUFF SURGERY: Primary | ICD-10-CM

## 2022-09-09 PROCEDURE — 97140 MANUAL THERAPY 1/> REGIONS: CPT | Performed by: PHYSICAL THERAPIST

## 2022-09-09 PROCEDURE — 97110 THERAPEUTIC EXERCISES: CPT | Performed by: PHYSICAL THERAPIST

## 2022-09-09 NOTE — THERAPY TREATMENT NOTE
Outpatient Physical Therapy Ortho Treatment Note  ALEC Staton     Patient Name: Julia Lujan  : 1970  MRN: [id number]  Today's Date: 2022      Visit Date: 2022    Visit Dx:    ICD-10-CM ICD-9-CM   1. S/P rotator cuff surgery  Z98.890 V45.89       Patient Active Problem List   Diagnosis   • Essential (primary) hypertension   • Major depressive disorder, single episode, unspecified   • Screen for colon cancer   • Complex tear of medial meniscus of left knee as current injury   • Complete tear of right rotator cuff   • Subacromial impingement of right shoulder   • Biceps tendinitis of right upper extremity   • s/p right shoulder arthroscopy with rotator cuff repair complex with bio inductive implant, biceps tenodesis DOS 2022        Past Medical History:   Diagnosis Date   • Arthritis     neck and ble   • Arthritis of neck 2021   • Cervicalgia    • CTS (carpal tunnel syndrome)    • Elevated blood pressure reading without diagnosis of hypertension    • Essential (primary) hypertension    • H/O mammogram 2019   • Headache    • Hematuria, unspecified    • Knee swelling    • Major depressive disorder, single episode, unspecified    • Menopausal and female climacteric states    • Nevus, non-neoplastic    • Pain in thoracic spine    • Papanicolaou smear    • Pneumonia, unspecified organism    • Radiculopathy, cervical region    • Rib pain    • Rotator cuff syndrome    • Tear of meniscus of knee    • Unspecified injury of muscle(s) and tendon(s) of the rotator cuff of right shoulder, initial encounter         Past Surgical History:   Procedure Laterality Date   • BREAST IMPLANT SURGERY Bilateral    • COLONOSCOPY W/ POLYPECTOMY N/A 2022    Procedure: COLONOSCOPY WITH POLYPECTOMY;  Surgeon: Clinton Hernandez MD;  Location: [facility];  Service: Gastroenterology;  Laterality: N/A;  sigmoid polyps x 2     • HYSTERECTOMY     • LIPOSUCTION       bilateral thighs   • NECK SURGERY  2021    Les chao   • SHOULDER ARTHROSCOPY W/ ROTATOR CUFF REPAIR Right 04/12/2022    Procedure: SHOULDER ARTHROSCOPY WITH ROTATOR CUFF REPAIR COMPLEX WITH BIOINDUCTIVE IMPLANT, BICEPS TENDONESIS;  Surgeon: Kale Wilkerson MD;  Location: [facility];  Service: Orthopedics;  Laterality: Right;   • SHOULDER SURGERY  April 12,2022                        PT Assessment/Plan     Row Name 09/09/22 1000          PT Assessment    Assessment Comments Pt is doing well with decreased pain, good ROM, and improved function at work and with ADLs.  -GC            PT Plan    PT Plan Comments Pt is out of town next week. Will re-ck in 2 weeks.  -GC           User Key  (r) = Recorded By, (t) = Taken By, (c) = Cosigned By    Initials Name Provider Type    GC Brett Iyer, PT Physical Therapist                 Modalities     Row Name 09/09/22 1000             Subjective Comments    Subjective Comments Pt says she is doing well with good function at work and with ADLs.  -GC              Moist Heat    MH Applied Yes  -GC      Location right shoulder - sitting  -GC      PT Moist Heat Minutes 10  -GC      MH Prior to Rx Yes  -GC            User Key  (r) = Recorded By, (t) = Taken By, (c) = Cosigned By    Initials Name Provider Type    GC Brett Iyer, PT Physical Therapist               OP Exercises     Row Name 09/09/22 1000             Subjective Comments    Subjective Comments Pt says she is doing well with good function at work and with ADLs.  -GC              Exercise 1    Exercise Name 1 Cane stretch-FLEX  -GC      Cueing 1 Verbal;Demo  -GC      Reps 1 15  -GC      Time 1 5 secs  -GC              Exercise 2    Exercise Name 2 Cane stretch-ABD  -GC      Cueing 2 Verbal;Demo  -GC      Reps 2 15  -GC      Time 2 5 secs  -GC              Exercise 3    Exercise Name 3 Cane stretch-EX  -GC      Cueing 3 Verbal;Demo  -GC      Reps 3 15  -GC      Time 3 5 secs  -GC              Exercise 4     Exercise Name 4 Pulley-FLEX  -GC      Cueing 4 Verbal;Demo  -GC      Time 4 5 min  -GC              Exercise 5    Exercise Name 5 Pulley-Scaption  -GC      Cueing 5 Verbal;Demo  -GC      Time 5 5 min  -GC              Exercise 6    Exercise Name 6 Shoulder ER vs theraband  -GC      Cueing 6 Verbal;Demo  -GC      Reps 6 25  -GC      Time 6 blue  -GC              Exercise 7    Exercise Name 7 Shoulder IR vs theraband  -GC      Cueing 7 Verbal;Demo  -GC      Reps 7 25  -GC      Time 7 blue  -GC              Exercise 8    Exercise Name 8 Shoulder Rows vs therabamd  -GC      Cueing 8 Verbal;Demo  -GC      Reps 8 25  -GC      Time 8 blue  -GC              Exercise 9    Exercise Name 9 Shoulder EXT vs theraband  -GC      Cueing 9 Verbal;Demo  -GC      Reps 9 25  -GC      Time 9 blue  -GC              Exercise 10    Exercise Name 10 Scaption Up  -GC      Cueing 10 Verbal;Demo  -GC      Reps 10 25  -GC      Time 10 3#  -GC              Exercise 11    Exercise Name 11 Scaption Down  -GC      Cueing 11 Verbal;Demo  -GC      Reps 11 25  -GC      Time 11 3#  -GC              Exercise 12    Exercise Name 12 sidelying shoulder ER  -GC      Cueing 12 Verbal;Demo  -GC      Reps 12 25  -GC      Time 12 3#  -GC              Exercise 13    Exercise Name 13 Prone Hor ABD 90  -GC      Cueing 13 Verbal;Tactile  -GC      Reps 13 25  -GC      Time 13 3#  -GC              Exercise 14    Exercise Name 14 Prone Hor   -GC      Cueing 14 Verbal;Tactile  -GC      Reps 14 25  -GC      Time 14 3#  -GC            User Key  (r) = Recorded By, (t) = Taken By, (c) = Cosigned By    Initials Name Provider Type    GC Brett Iyer, MICHELLE Physical Therapist                         Manual Rx (last 36 hours)     Manual Treatments     Row Name 09/09/22 1000             Manual Rx 1    Manual Rx 1 Location right shoulder  -GC      Manual Rx 1 Type PROM in FLEX-ABD-ER-IR per massive rotator cuff protocol  -GC      Manual Rx 1 Duration 15 min  -GC             User Key  (r) = Recorded By, (t) = Taken By, (c) = Cosigned By    Initials Name Provider Type     Brett Iyer, PT Physical Therapist                                   Time Calculation:   Start Time: 1000  Stop Time: 1058  Time Calculation (min): 58 min  Untimed Charges  PT Moist Heat Minutes: 10  Total Minutes  Untimed Charges Total Minutes: 10   Total Minutes: 10  Therapy Charges for Today     Code Description Service Date Service Provider Modifiers Qty    39000618760  PT THER PROC EA 15 MIN 9/9/2022 Brett Iyer, PT GP 1    [id number]  PT MANUAL THERAPY EA 15 MIN 9/9/2022 Brett Iyer, PT GP 1                    Brett Iyer PT  9/9/2022

## 2022-09-22 ENCOUNTER — HOSPITAL ENCOUNTER (OUTPATIENT)
Dept: PHYSICAL THERAPY | Facility: HOSPITAL | Age: 52
Setting detail: THERAPIES SERIES
Discharge: HOME | End: 2022-09-22

## 2022-09-22 DIAGNOSIS — Z98.890 S/P ROTATOR CUFF SURGERY: Primary | ICD-10-CM

## 2022-09-22 PROCEDURE — 97140 MANUAL THERAPY 1/> REGIONS: CPT | Performed by: PHYSICAL THERAPIST

## 2022-09-22 PROCEDURE — 97110 THERAPEUTIC EXERCISES: CPT | Performed by: PHYSICAL THERAPIST

## 2022-09-22 NOTE — THERAPY TREATMENT NOTE
Outpatient Physical Therapy Ortho Treatment Note  ALEC Staton     Patient Name: Julia Lujan  : 1970  MRN: [id number]  Today's Date: 2022      Visit Date: 2022    Visit Dx:    ICD-10-CM ICD-9-CM   1. S/P rotator cuff surgery  Z98.890 V45.89       Patient Active Problem List   Diagnosis   • Essential (primary) hypertension   • Major depressive disorder, single episode, unspecified   • Screen for colon cancer   • Complex tear of medial meniscus of left knee as current injury   • Complete tear of right rotator cuff   • Subacromial impingement of right shoulder   • Biceps tendinitis of right upper extremity   • s/p right shoulder arthroscopy with rotator cuff repair complex with bio inductive implant, biceps tenodesis DOS 2022        Past Medical History:   Diagnosis Date   • Arthritis     neck and ble   • Arthritis of neck 2021   • Cervicalgia    • CTS (carpal tunnel syndrome)    • Elevated blood pressure reading without diagnosis of hypertension    • Essential (primary) hypertension    • H/O mammogram 2019   • Headache    • Hematuria, unspecified    • Knee swelling    • Major depressive disorder, single episode, unspecified    • Menopausal and female climacteric states    • Nevus, non-neoplastic    • Pain in thoracic spine    • Papanicolaou smear    • Pneumonia, unspecified organism    • Radiculopathy, cervical region    • Rib pain    • Rotator cuff syndrome    • Tear of meniscus of knee    • Unspecified injury of muscle(s) and tendon(s) of the rotator cuff of right shoulder, initial encounter         Past Surgical History:   Procedure Laterality Date   • BREAST IMPLANT SURGERY Bilateral    • COLONOSCOPY W/ POLYPECTOMY N/A 2022    Procedure: COLONOSCOPY WITH POLYPECTOMY;  Surgeon: Clinton Hernandez MD;  Location: [facility];  Service: Gastroenterology;  Laterality: N/A;  sigmoid polyps x 2     • HYSTERECTOMY     • LIPOSUCTION       bilateral thighs   • NECK SURGERY  2021    Les chao   • SHOULDER ARTHROSCOPY W/ ROTATOR CUFF REPAIR Right 04/12/2022    Procedure: SHOULDER ARTHROSCOPY WITH ROTATOR CUFF REPAIR COMPLEX WITH BIOINDUCTIVE IMPLANT, BICEPS TENDONESIS;  Surgeon: Kale Wilkerson MD;  Location[facility];  Service: Orthopedics;  Laterality: Right;   • SHOULDER SURGERY  April 12,2022                        PT Assessment/Plan     Row Name 09/22/22 1200          PT Assessment    Assessment Comments Pt is doing well with reported good funcitonal mobility and strength.  -GC            PT Plan    PT Plan Comments Pt is to continue her HEP daily.  -GC           User Key  (r) = Recorded By, (t) = Taken By, (c) = Cosigned By    Initials Name Provider Type    Brett Vogel, PT Physical Therapist                   OP Exercises     Row Name 09/22/22 1200             Subjective Comments    Subjective Comments Pt states she is doing alright and reports no real issues with her ADLs.  -GC              Exercise 1    Exercise Name 1 Cane stretch-FLEX  -GC      Cueing 1 Verbal;Demo  -GC      Reps 1 15  -GC      Time 1 5 secs  -GC              Exercise 2    Exercise Name 2 Cane stretch-ABD  -GC      Cueing 2 Verbal;Demo  -GC      Reps 2 15  -GC      Time 2 5 secs  -GC              Exercise 3    Exercise Name 3 Cane stretch-EX  -GC      Cueing 3 Verbal;Demo  -GC      Reps 3 15  -GC      Time 3 5 secs  -GC              Exercise 4    Exercise Name 4 Pulley-FLEX  -GC      Cueing 4 Verbal;Demo  -GC      Time 4 5 min  -GC              Exercise 5    Exercise Name 5 Pulley-Scaption  -GC      Cueing 5 Verbal;Demo  -GC      Time 5 5 min  -GC              Exercise 6    Exercise Name 6 Shoulder ER vs theraband  -GC      Cueing 6 Verbal;Demo  -GC      Reps 6 25  -GC      Time 6 blue  -GC              Exercise 7    Exercise Name 7 Shoulder IR vs theraband  -GC      Cueing 7 Verbal;Demo  -GC      Reps 7 25  -GC      Time 7 blue  -GC              Exercise 8     Exercise Name 8 Shoulder Rows vs therabamd  -GC      Cueing 8 Verbal;Demo  -GC      Reps 8 25  -GC      Time 8 blue  -GC              Exercise 9    Exercise Name 9 Shoulder EXT vs theraband  -GC      Cueing 9 Verbal;Demo  -GC      Reps 9 25  -GC      Time 9 blue  -GC              Exercise 10    Exercise Name 10 Scaption Up  -GC      Cueing 10 Verbal;Demo  -GC      Reps 10 25  -GC      Time 10 3#  -GC              Exercise 11    Exercise Name 11 Scaption Down  -GC      Cueing 11 Verbal;Demo  -GC      Reps 11 25  -GC      Time 11 3#  -GC              Exercise 12    Exercise Name 12 sidelying shoulder ER  -GC      Cueing 12 Verbal;Demo  -GC      Reps 12 25  -GC      Time 12 3#  -GC              Exercise 13    Exercise Name 13 Prone Hor ABD 90  -GC      Cueing 13 Verbal;Tactile  -GC      Reps 13 25  -GC      Time 13 3#  -GC              Exercise 14    Exercise Name 14 Prone Hor   -GC      Cueing 14 Verbal;Tactile  -GC      Reps 14 25  -GC      Time 14 3#  -GC            User Key  (r) = Recorded By, (t) = Taken By, (c) = Cosigned By    Initials Name Provider Type     rBett Iyer, PT Physical Therapist                         Manual Rx (last 36 hours)     Manual Treatments     Row Name 09/22/22 1200             Manual Rx 1    Manual Rx 1 Location right shoulder  -GC      Manual Rx 1 Type PROM in FLEX-ABD-ER-IR per massive rotator cuff protocol  -GC      Manual Rx 1 Duration 15 min  -GC            User Key  (r) = Recorded By, (t) = Taken By, (c) = Cosigned By    Initials Name Provider Type     Brett Iyer, PT Physical Therapist                                   Time Calculation:   Start Time: 1200  Stop Time: 1256  Time Calculation (min): 56 min  Therapy Charges for Today     Code Description Service Date Service Provider Modifiers Qty    28188240824 HC PT THER PROC EA 15 MIN 9/22/2022 Brett Iyer, PT GP 1    [id number] HC PT MANUAL THERAPY EA 15 MIN 9/22/2022 Brett Iyer, PT GP 1                     Brett Iyer, PT  9/22/2022

## 2022-10-07 ENCOUNTER — HOSPITAL ENCOUNTER (OUTPATIENT)
Dept: PHYSICAL THERAPY | Facility: HOSPITAL | Age: 52
Setting detail: THERAPIES SERIES
Discharge: HOME | End: 2022-10-07

## 2022-10-07 NOTE — THERAPY RE-EVALUATION
Outpatient Physical Therapy Ortho Re-Evaluation  ALEC Staton     Patient Name: Julia Lujan  : 1970  MRN: [id number]  Today's Date: 10/7/2022      Visit Date: 10/07/2022    Patient Active Problem List   Diagnosis   • Essential (primary) hypertension   • Major depressive disorder, single episode, unspecified   • Screen for colon cancer   • Complex tear of medial meniscus of left knee as current injury   • Complete tear of right rotator cuff   • Subacromial impingement of right shoulder   • Biceps tendinitis of right upper extremity   • s/p right shoulder arthroscopy with rotator cuff repair complex with bio inductive implant, biceps tenodesis DOS 2022        Past Medical History:   Diagnosis Date   • Arthritis     neck and ble   • Arthritis of neck 2021   • Cervicalgia    • CTS (carpal tunnel syndrome)    • Elevated blood pressure reading without diagnosis of hypertension    • Essential (primary) hypertension    • H/O mammogram 2019   • Headache    • Hematuria, unspecified    • Knee swelling    • Major depressive disorder, single episode, unspecified    • Menopausal and female climacteric states    • Nevus, non-neoplastic    • Pain in thoracic spine    • Papanicolaou smear    • Pneumonia, unspecified organism    • Radiculopathy, cervical region    • Rib pain    • Rotator cuff syndrome    • Tear of meniscus of knee    • Unspecified injury of muscle(s) and tendon(s) of the rotator cuff of right shoulder, initial encounter         Past Surgical History:   Procedure Laterality Date   • BREAST IMPLANT SURGERY Bilateral    • COLONOSCOPY W/ POLYPECTOMY N/A 2022    Procedure: COLONOSCOPY WITH POLYPECTOMY;  Surgeon: Clinton Hernandez MD;  Location: [facility];  Service: Gastroenterology;  Laterality: N/A;  sigmoid polyps x 2     • HYSTERECTOMY     • LIPOSUCTION      bilateral thighs   • NECK SURGERY      Les chao   • SHOULDER ARTHROSCOPY  W/ ROTATOR CUFF REPAIR Right 04/12/2022    Procedure: SHOULDER ARTHROSCOPY WITH ROTATOR CUFF REPAIR COMPLEX WITH BIOINDUCTIVE IMPLANT, BICEPS TENDONESIS;  Surgeon: Kale Wilkerson MD;  Location: [facility];  Service: Orthopedics;  Laterality: Right;   • SHOULDER SURGERY  April 12,2022       Visit Dx:   No diagnosis found.           PT Ortho     Row Name 10/07/22 1300       Subjective Comments    Subjective Comments Pt voices no complaints and says she can now fasten bra behind her back.  -GC       Right Upper Ext    Rt Shoulder Abduction AROM 160 degrees  -GC    Rt Shoulder Flexion AROM 165 degrees  -GC    Rt Shoulder External Rotation AROM 81 degrees  -GC    Rt Shoulder Internal Rotation AROM 78 degrees  -GC       MMT (Manual Muscle Testing)    General MMT Comments scaption strength is 4+/5  -GC       MMT Right Upper Ext    Rt Shoulder Flexion MMT, Gross Movement (4+/5) good plus  -GC    Rt Shoulder Extension MMT, Gross Movement (5/5) normal  -GC    Rt Shoulder ABduction MMT, Gross Movement (4+/5) good plus  -GC    Rt Shoulder ADduction MMT, Gross Movement (5/5) normal  -GC    Rt Shoulder Internal Rotation MMT, Gross Movement (5/5) normal  -GC    Rt Shoulder External Rotation MMT, Gross Movement (4+/5) good plus  -GC          User Key  (r) = Recorded By, (t) = Taken By, (c) = Cosigned By    Initials Name Provider Type    Brett Vogel, MICHELLE Physical Therapist                                   PT OP Goals     Row Name 10/07/22 1300          PT Short Term Goals    STG Date to Achieve 05/31/22  -     STG 1 Decrease right shoulder pain to 1-2/10 with activity.  -     STG 1 Progress Met  -     STG 2 Increase PROM of right shoulder to 145 degrees FLEX and ABD and 75 degrees ER and IR with testing.  -     STG 2 Progress Met  -     STG 3 Pt will be independent with her HEP issued by this therapist.  -     STG 3 Progress Met  -            Long Term Goals    LTG Date to Achieve 06/28/22  -     LTG 1  Decrease right shoulder pain to 0-1/10 with activity.  -     LTG 1 Progress Met  -     LTG 2 Increase AROM of right shoulder to 175 degrees FLEX and ABD and 85 degrees ER and IR with testing.  -GC     LTG 2 Progress Ongoing;Progressing  -     LTG 3 Increase right shoulder girdle strength to at least 4+/5 all planes with testing.  -     LTG 3 Progress Met  -     LTG 4 Pt will be independent with all ADLs and have a Quick Dash score < 12.  -     LTG 4 Progress Partially Met;Ongoing;Progressing  -           User Key  (r) = Recorded By, (t) = Taken By, (c) = Cosigned By    Initials Name Provider Type    Brett Vogel, PT Physical Therapist                 PT Assessment/Plan     Row Name 10/07/22 1300          PT Assessment    Assessment Comments Pt is doing well with good ROM and strength. She is reporting improved function. Feel she no longer needs skilled therapy services.  -            PT Plan    PT Plan Comments Pt will continue her HEP daily.  -           User Key  (r) = Recorded By, (t) = Taken By, (c) = Cosigned By    Initials Name Provider Type    Brett Vogel PT Physical Therapist                   OP Exercises     Row Name 10/07/22 1300             Subjective Comments    Subjective Comments Pt voices no complaints and says she can now fasten bra behind her back.  -            User Key  (r) = Recorded By, (t) = Taken By, (c) = Cosigned By    Initials Name Provider Type    Brett Vogel, PT Physical Therapist                                        Time Calculation:                         Brett Iyer PT  10/7/2022

## 2022-11-23 ENCOUNTER — OFFICE VISIT (OUTPATIENT)
Dept: ORTHOPEDIC SURGERY | Facility: CLINIC | Age: 52
End: 2022-11-23

## 2022-11-23 VITALS — HEIGHT: 64 IN | BODY MASS INDEX: 28.85 KG/M2 | WEIGHT: 169 LBS

## 2022-11-23 DIAGNOSIS — M75.41 SUBACROMIAL IMPINGEMENT OF RIGHT SHOULDER: ICD-10-CM

## 2022-11-23 DIAGNOSIS — M75.01 ADHESIVE CAPSULITIS OF RIGHT SHOULDER: ICD-10-CM

## 2022-11-23 DIAGNOSIS — Z98.890 STATUS POST ARTHROSCOPY OF SHOULDER: Primary | ICD-10-CM

## 2022-11-23 PROCEDURE — 99212 OFFICE O/P EST SF 10 MIN: CPT | Performed by: ORTHOPAEDIC SURGERY

## 2022-11-23 RX ORDER — TOPIRAMATE 50 MG/1
50 TABLET, FILM COATED ORAL
COMMUNITY
Start: 2022-11-07

## 2022-11-23 RX ORDER — MILK THISTLE 150 MG
CAPSULE ORAL
COMMUNITY

## 2022-11-23 NOTE — PROGRESS NOTES
Subjective:     Patient ID: Julia Lujan is a 52 y.o. female.    Chief Complaint:  F/u s/p right shoulder rotator cuff repair and biceps tenodesis  DOS 4/12/2022  Plan last visit-continue home exercises, anti-inflammatory medications as needed    History of Present Illness  Julia Lujan returns to clinic today for evaluation of right shoulder. She is doing very well with her right shoulder. She has minimal irritation when she sleeps on that right side or with position of the arm behind her head. She rates it as 2 to 3 out of 10 and she does get it. She denies any associated numbness or tingling. No fevers, chills, or sweats. 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 She does occasionally have to make some modifications because of some arm irritation, but very minimally at this point in time.       Social History     Occupational History   • Occupation:    Tobacco Use   • Smoking status: Former     Packs/day: 0.50     Years: 20.00     Pack years: 10.00     Types: Electronic Cigarette, Cigarettes     Start date: 1/20/1999     Quit date: 4/1/2019     Years since quitting: 3.6   • Smokeless tobacco: Never   • Tobacco comments:     QUIT 2019 STATED AGE 13   Vaping Use   • Vaping Use: Never used   Substance and Sexual Activity   • Alcohol use: Yes     Alcohol/week: 10.0 standard drinks     Types: 10 Shots of liquor per week     Comment: WINE 5X A WEEK   • Drug use: Yes     Types: Marijuana     Comment: ROUTINELY   • Sexual activity: Yes     Partners: Male     Birth control/protection: Surgical      Past Medical History:   Diagnosis Date   • Arthritis     neck and ble   • Arthritis of neck Jan 2021   • Cervicalgia    • CTS (carpal tunnel syndrome) 2021   • Elevated blood pressure reading without diagnosis of hypertension    • Essential (primary) hypertension    • H/O mammogram 01/2019   • Headache    • Hematuria, unspecified    • Knee swelling 2022   • Major  depressive disorder, single episode, unspecified    • Menopausal and female climacteric states    • Nevus, non-neoplastic    • Pain in thoracic spine    • Papanicolaou smear 2017   • Pneumonia, unspecified organism    • Radiculopathy, cervical region    • Rib pain    • Rotator cuff syndrome March , 2022   • Tear of meniscus of knee 2022   • Unspecified injury of muscle(s) and tendon(s) of the rotator cuff of right shoulder, initial encounter      Past Surgical History:   Procedure Laterality Date   • BREAST IMPLANT SURGERY Bilateral 2000   • COLONOSCOPY W/ POLYPECTOMY N/A 03/11/2022    Procedure: COLONOSCOPY WITH POLYPECTOMY;  Surgeon: Clinton Hernandez MD;  Location: [facility];  Service: Gastroenterology;  Laterality: N/A;  sigmoid polyps x 2     • HYSTERECTOMY     • LIPOSUCTION      bilateral thighs   • NECK SURGERY  2021    Les chao   • SHOULDER ARTHROSCOPY W/ ROTATOR CUFF REPAIR Right 04/12/2022    Procedure: SHOULDER ARTHROSCOPY WITH ROTATOR CUFF REPAIR COMPLEX WITH BIOINDUCTIVE IMPLANT, BICEPS TENDONESIS;  Surgeon: Kale Wilkerson MD;  Location: [facility];  Service: Orthopedics;  Laterality: Right;   • SHOULDER SURGERY  April 12,2022       Family History   Problem Relation Age of Onset   • Lymphoma Mother    • Cancer Mother         Passed away 2019   • Other Father         BLADDER CANCER   • Cancer Father         Recovered   • Scoliosis Father    • Breast cancer Maternal Grandmother    • Cancer Maternal Grandmother         Passed away 2019   • Other Maternal Uncle         BRACA GENE+   • Cancer Maternal Uncle         Passed away 2020         Review of Systems        Objective:  There were no vitals filed for this visit.  There were no vitals filed for this visit.  There is no height or weight on file to calculate BMI.  General: No acute distress.  Resp: normal respiratory effort  Skin: no rashes or wounds; normal turgor  Psych: mood and affect appropriate; recent and remote memory  intact          Ortho Exam       Right shoulder-  Incision is well healed.  Active Forward Flexion- 175 degrees  Strength- 4+/5  Active External Rotation- 70 degrees  Strength- 5/5  Internal Rotation- T10  Belly press test strength- 5/5  Positive deltoid firing in all three components.  Brisk cap refill to all digits, palpable 2+ radial pulse  Positive sensation to light touch palmar, dorsal aspects of small and index fingers and anatomic snuffbox right hand, symmetric to the left.    Imaging:  None today  Assessment:        1. s/p right shoulder arthroscopy with rotator cuff repair complex with bio inductive implant, biceps tenodesis DOS 04/12/2022    2. Adhesive capsulitis of right shoulder    3. Subacromial impingement of right shoulder           Plan:        1. Discussed treatment options at length with patient at today's visit.    2. The patient is doing well at this point in time. She has good motion and function of her shoulder. She still does have some weakness with overhead activities, in particular with resisted forward flexion on exam, but this does seem to be improving.    3. Recommended she continue home exercises for strengthening as well as functional activities as tolerated.    4. I would anticipate her to continue to see improvement out to here after her surgery.    5. She will follow up as needed.        Julia Lujan was in agreement with plan and had all questions answered.     Orders:  No orders of the defined types were placed in this encounter.      Medications:  No orders of the defined types were placed in this encounter.      Followup:  No follow-ups on file.    Diagnoses and all orders for this visit:    1. s/p right shoulder arthroscopy with rotator cuff repair complex with bio inductive implant, biceps tenodesis DOS 04/12/2022 (Primary)    2. Adhesive capsulitis of right shoulder    3. Subacromial impingement of right shoulder          Dictated utilizing Dragon dictation   Transcribed  from ambient dictation for Kale Wilkerson MD by Mya Beltrán.  11/23/22   10:36 EST    Patient or patient representative verbalized consent to the visit recording.  I have personally performed the services described in this document as transcribed by the above individual, and it is both accurate and complete.

## 2024-10-09 ENCOUNTER — TRANSCRIBE ORDERS (OUTPATIENT)
Dept: ADMINISTRATIVE | Facility: HOSPITAL | Age: 54
End: 2024-10-09
Payer: COMMERCIAL

## 2024-10-09 ENCOUNTER — HOSPITAL ENCOUNTER (OUTPATIENT)
Dept: GENERAL RADIOLOGY | Facility: HOSPITAL | Age: 54
Discharge: HOME | End: 2024-10-09
Admitting: NURSE PRACTITIONER
Payer: COMMERCIAL

## 2024-10-09 DIAGNOSIS — M54.2 NECK PAIN: ICD-10-CM

## 2024-10-09 DIAGNOSIS — M54.2 NECK PAIN: Primary | ICD-10-CM

## 2024-10-09 PROCEDURE — 72052 X-RAY EXAM NECK SPINE 6/>VWS: CPT

## (undated) DEVICE — 3M™ MEDIPORE™ H SOFT CLOTH SURGICAL TAPE 2864, 4 INCH X 10 YARD (10CM X 9,14M), 12 ROLLS/CASE: Brand: 3M™ MEDIPORE™

## (undated) DEVICE — DYONICS 5.5 FULL RADIUS BONECUTTER                                    BLADES, ORANGE, 8000 MAXIMUM RPM,                                    PACKAGED 6 PER BOX, STERILE

## (undated) DEVICE — 3M™ STERI-STRIP™ REINFORCED ADHESIVE SKIN CLOSURES, R1547, 1/2 IN X 4 IN (12 MM X 100 MM), 6 STRIPS/ENVELOPE: Brand: 3M™ STERI-STRIP™

## (undated) DEVICE — SYR LUERLOK 20CC BX/50

## (undated) DEVICE — PK SHLDR ARTHSCP 90

## (undated) DEVICE — Q-FIX REUSABLE 2.8MM PATHFINDER OBTURATOR: Brand: Q-FIX

## (undated) DEVICE — DRSNG SURESITE WNDW 4X4.5

## (undated) DEVICE — ACCU-PASS SUTURE SHUTTLE 45                                    DEGREE, UPBEND, STERILE: Brand: ACCU-PASS

## (undated) DEVICE — 4.5 FULL RADIUS BONECUTTER BLADES,                                    YELLOW, 8000 MAXIMUM RPM, PACKAGED 6                                    PER BOX, STERILE

## (undated) DEVICE — 3M™ STERI-DRAPE™ U-DRAPE 1015: Brand: STERI-DRAPE™

## (undated) DEVICE — DRSNG GZ PETROLTM XEROFORM CURAD 1X8IN STRL

## (undated) DEVICE — CANNULA THREADED FLEX 5.5 X 72MM: Brand: CLEAR-TRAC

## (undated) DEVICE — GLV SURG SENSICARE PI PF LF 7 GRN STRL

## (undated) DEVICE — 1010 S-DRAPE TOWEL DRAPE 10/BX: Brand: STERI-DRAPE™

## (undated) DEVICE — ADAPT CLN BIOGUARD AIR/H2O DISP

## (undated) DEVICE — SPNG GZ WOVN 4X4IN 12PLY 10/BX STRL

## (undated) DEVICE — PREP SOL POVIDONE/IODINE BT 4OZ

## (undated) DEVICE — ADAPT DB SPIKE 2PCT FOR AR6400 TBG

## (undated) DEVICE — VIAL FORMALIN CAP 10P 40ML

## (undated) DEVICE — TBG PENCL TELESCP MEGADYNE SMOKE EVAC 10FT

## (undated) DEVICE — BW-412T DISP COMBO CLEANING BRUSH: Brand: SINGLE USE COMBINATION CLEANING BRUSH

## (undated) DEVICE — SYR LUERLOK 50ML

## (undated) DEVICE — T-MAX DISPOSABLE FACE MASK 8 PER BOX

## (undated) DEVICE — WRAP SHOULDER COLD THERAPY

## (undated) DEVICE — WEREWOLF FLOW 90 COBLATION WAND: Brand: COBLATION

## (undated) DEVICE — ACCU-PASS SUTURE SHUTTLE 45                                    DEGREE, RIGHT, STERILE: Brand: ACCU-PASS

## (undated) DEVICE — BOWL PLSTC LG 32OZ BLU STRL

## (undated) DEVICE — SHOULDER STABILIZATION KIT,                                    DISPOSABLE 12 PER BOX

## (undated) DEVICE — KT ORCA ORCAPOD DISP STRL

## (undated) DEVICE — GLV SURG SENSICARE PI LF PF 7.0

## (undated) DEVICE — Device

## (undated) DEVICE — FRCP BX RADJAW4 NDL 2.8 240CM LG OG BX40

## (undated) DEVICE — SYR LL 3CC

## (undated) DEVICE — INTENT TO BE USED WITH SUTURE MATERIAL FOR TISSUE CLOSURE: Brand: RICHARD-ALLAN® NEEDLE 1/2 CIRCLE TAPER

## (undated) DEVICE — SUT PDS 0 CT1 36IN Z346H

## (undated) DEVICE — CANNULA THREADED FLEX 8.0 X 72MM: Brand: CLEAR-TRAC

## (undated) DEVICE — GLV SURG SENSICARE PI MIC PF SZ7.5 LF STRL

## (undated) DEVICE — DECANT BG O JET

## (undated) DEVICE — ST TB GOFLO STRL

## (undated) DEVICE — SOL ISO/ALC RUB 70PCT 4OZ

## (undated) DEVICE — JACKT LAB F/R KNIT CUFF/COLR XLG BLU

## (undated) DEVICE — APPL CHLORAPREP HI/LITE 26ML ORNG

## (undated) DEVICE — DECANTER: Brand: UNBRANDED

## (undated) DEVICE — SUT VIC 3/0 SH CR8 18IN J864D

## (undated) DEVICE — SUT ETHLN 3/0 PS2 18 IN 1669H

## (undated) DEVICE — TOWEL,OR,DSP,ST,BLUE,STD,4/PK,20PK/CS: Brand: MEDLINE

## (undated) DEVICE — FIRSTPASS ST SUTURE PASSER, SELF CAPTURE: Brand: FIRSTPASS

## (undated) DEVICE — GLV SURG NEOPRENE SENSICARE PF SZ7.5 LF STRL

## (undated) DEVICE — NDL HYPO SFTY GLD 22G 1 1/2IN

## (undated) DEVICE — SAFELINER SUCTION CANISTER 1000CC: Brand: DEROYAL